# Patient Record
Sex: FEMALE | Race: WHITE | NOT HISPANIC OR LATINO | Employment: FULL TIME | URBAN - METROPOLITAN AREA
[De-identification: names, ages, dates, MRNs, and addresses within clinical notes are randomized per-mention and may not be internally consistent; named-entity substitution may affect disease eponyms.]

---

## 2018-07-11 ENCOUNTER — APPOINTMENT (OUTPATIENT)
Dept: LAB | Facility: CLINIC | Age: 23
End: 2018-07-11
Payer: COMMERCIAL

## 2018-07-11 ENCOUNTER — TRANSCRIBE ORDERS (OUTPATIENT)
Dept: LAB | Facility: CLINIC | Age: 23
End: 2018-07-11

## 2018-07-11 DIAGNOSIS — Z13.220 SCREENING FOR LIPOID DISORDERS: Primary | ICD-10-CM

## 2018-07-11 DIAGNOSIS — Z13.220 SCREENING FOR LIPOID DISORDERS: ICD-10-CM

## 2018-07-11 LAB
ALBUMIN SERPL BCP-MCNC: 3.4 G/DL (ref 3.5–5)
ALP SERPL-CCNC: 107 U/L (ref 46–116)
ALT SERPL W P-5'-P-CCNC: 54 U/L (ref 12–78)
ANION GAP SERPL CALCULATED.3IONS-SCNC: 7 MMOL/L (ref 4–13)
AST SERPL W P-5'-P-CCNC: 39 U/L (ref 5–45)
BASOPHILS # BLD AUTO: 0.04 THOUSANDS/ΜL (ref 0–0.1)
BASOPHILS NFR BLD AUTO: 1 % (ref 0–1)
BILIRUB SERPL-MCNC: 0.55 MG/DL (ref 0.2–1)
BUN SERPL-MCNC: 9 MG/DL (ref 5–25)
CALCIUM SERPL-MCNC: 9 MG/DL (ref 8.3–10.1)
CHLORIDE SERPL-SCNC: 105 MMOL/L (ref 100–108)
CHOLEST SERPL-MCNC: 105 MG/DL (ref 50–200)
CO2 SERPL-SCNC: 23 MMOL/L (ref 21–32)
CREAT SERPL-MCNC: 0.6 MG/DL (ref 0.6–1.3)
EOSINOPHIL # BLD AUTO: 0.12 THOUSAND/ΜL (ref 0–0.61)
EOSINOPHIL NFR BLD AUTO: 2 % (ref 0–6)
ERYTHROCYTE [DISTWIDTH] IN BLOOD BY AUTOMATED COUNT: 15.7 % (ref 11.6–15.1)
GFR SERPL CREATININE-BSD FRML MDRD: 128 ML/MIN/1.73SQ M
GLUCOSE P FAST SERPL-MCNC: 77 MG/DL (ref 65–99)
HCT VFR BLD AUTO: 39.6 % (ref 34.8–46.1)
HDLC SERPL-MCNC: 42 MG/DL (ref 40–60)
HGB BLD-MCNC: 11.8 G/DL (ref 11.5–15.4)
IMM GRANULOCYTES # BLD AUTO: 0.01 THOUSAND/UL (ref 0–0.2)
IMM GRANULOCYTES NFR BLD AUTO: 0 % (ref 0–2)
LDLC SERPL CALC-MCNC: 41 MG/DL (ref 0–100)
LYMPHOCYTES # BLD AUTO: 1.48 THOUSANDS/ΜL (ref 0.6–4.47)
LYMPHOCYTES NFR BLD AUTO: 30 % (ref 14–44)
MCH RBC QN AUTO: 24.2 PG (ref 26.8–34.3)
MCHC RBC AUTO-ENTMCNC: 29.8 G/DL (ref 31.4–37.4)
MCV RBC AUTO: 81 FL (ref 82–98)
MONOCYTES # BLD AUTO: 0.4 THOUSAND/ΜL (ref 0.17–1.22)
MONOCYTES NFR BLD AUTO: 8 % (ref 4–12)
NEUTROPHILS # BLD AUTO: 2.89 THOUSANDS/ΜL (ref 1.85–7.62)
NEUTS SEG NFR BLD AUTO: 59 % (ref 43–75)
NONHDLC SERPL-MCNC: 63 MG/DL
NRBC BLD AUTO-RTO: 0 /100 WBCS
PLATELET # BLD AUTO: 364 THOUSANDS/UL (ref 149–390)
PMV BLD AUTO: 10.6 FL (ref 8.9–12.7)
POTASSIUM SERPL-SCNC: 4.1 MMOL/L (ref 3.5–5.3)
PROT SERPL-MCNC: 8.7 G/DL (ref 6.4–8.2)
RBC # BLD AUTO: 4.88 MILLION/UL (ref 3.81–5.12)
SODIUM SERPL-SCNC: 135 MMOL/L (ref 136–145)
TRIGL SERPL-MCNC: 109 MG/DL
TSH SERPL DL<=0.05 MIU/L-ACNC: 1.93 UIU/ML (ref 0.36–3.74)
WBC # BLD AUTO: 4.94 THOUSAND/UL (ref 4.31–10.16)

## 2018-07-11 PROCEDURE — 80061 LIPID PANEL: CPT

## 2018-07-11 PROCEDURE — 84443 ASSAY THYROID STIM HORMONE: CPT

## 2018-07-11 PROCEDURE — 85025 COMPLETE CBC W/AUTO DIFF WBC: CPT

## 2018-07-11 PROCEDURE — 36415 COLL VENOUS BLD VENIPUNCTURE: CPT

## 2018-07-11 PROCEDURE — 80053 COMPREHEN METABOLIC PANEL: CPT

## 2018-12-14 ENCOUNTER — OFFICE VISIT (OUTPATIENT)
Dept: URGENT CARE | Facility: CLINIC | Age: 23
End: 2018-12-14
Payer: COMMERCIAL

## 2018-12-14 VITALS
BODY MASS INDEX: 42.33 KG/M2 | SYSTOLIC BLOOD PRESSURE: 132 MMHG | OXYGEN SATURATION: 99 % | DIASTOLIC BLOOD PRESSURE: 84 MMHG | HEIGHT: 62 IN | WEIGHT: 230 LBS | HEART RATE: 100 BPM | RESPIRATION RATE: 16 BRPM | TEMPERATURE: 99.5 F

## 2018-12-14 DIAGNOSIS — J02.9 SORE THROAT: Primary | ICD-10-CM

## 2018-12-14 LAB — S PYO AG THROAT QL: NEGATIVE

## 2018-12-14 PROCEDURE — 99213 OFFICE O/P EST LOW 20 MIN: CPT | Performed by: PHYSICIAN ASSISTANT

## 2018-12-14 PROCEDURE — 87430 STREP A AG IA: CPT | Performed by: PHYSICIAN ASSISTANT

## 2018-12-14 PROCEDURE — S9088 SERVICES PROVIDED IN URGENT: HCPCS | Performed by: PHYSICIAN ASSISTANT

## 2018-12-14 PROCEDURE — 87070 CULTURE OTHR SPECIMN AEROBIC: CPT | Performed by: PHYSICIAN ASSISTANT

## 2018-12-14 RX ORDER — METHYLPHENIDATE HYDROCHLORIDE 54 MG/1
TABLET ORAL
COMMUNITY

## 2018-12-14 RX ORDER — LEVONORGESTREL / ETHINYL ESTRADIOL AND ETHINYL ESTRADIOL 150-30(84)
1 KIT ORAL
COMMUNITY
Start: 2018-09-21

## 2018-12-14 RX ORDER — PHENOL 1.4 %
AEROSOL, SPRAY (ML) MUCOUS MEMBRANE
COMMUNITY

## 2018-12-14 NOTE — PROGRESS NOTES
[facility]        NAME: David Nava is a 24 y o  female  : 1995    MRN: [id number]  DATE: 2018  TIME: 10:21 AM    Assessment and Plan   Sore throat [J02 9]  1  Sore throat  POCT rapid strepA         Patient Instructions       Negative rapid strep here  Cobblestoning and benign exam   Viral sore throat  Needs Tylenol and Motrin for sore throat, saltwater gargles  Should resolve in 3-5 days  We will check a culture and call if it is positive  Follow up with PCP in 3-5 days  Proceed to  ER if symptoms worsen  Chief Complaint     Chief Complaint   Patient presents with    Sore Throat     hurts to swollow x 3-4 days, phlegm in throat, took mucinex    Earache     right ear x 1 day         History of Present Illness         43-year-old female complains of sore throat for 2 days  She has right ear pain  She took Mucinex for congestion and cough  No fever at home  No nausea or vomiting  She says it hurts to swallow          Review of Systems   Review of Systems      Current Medications       Current Outpatient Prescriptions:     Levonorgest-Eth Estrad 91-Day (CAMRESE) 0 15-0 03 &0 01 MG TABS, Take 1 tablet by mouth, Disp: , Rfl:     Melatonin 10 MG TABS, Take by mouth, Disp: , Rfl:     methylphenidate (CONCERTA) 54 MG ER tablet, take 1 tablet by mouth once daily, Disp: , Rfl:     Current Allergies     Allergies as of 2018 - Reviewed 2018   Allergen Reaction Noted    Bupropion Other (See Comments) 2015    Lamotrigine  2014            The following portions of the patient's history were reviewed and updated as appropriate: allergies, current medications, past family history, past medical history, past social history, past surgical history and problem list      Past Medical History:   Diagnosis Date    ADHD (attention deficit hyperactivity disorder)        Past Surgical History:   Procedure Laterality Date    KNEE SURGERY      WISDOM TOOTH EXTRACTION      WRIST SURGERY         Family History   Problem Relation Age of Onset    Adopted: Yes         Medications have been verified  Objective   /84 (BP Location: Right arm, Patient Position: Sitting, Cuff Size: Standard)   Pulse 100   Temp 99 5 °F (37 5 °C) (Tympanic)   Resp 16   Ht 5' 1 75" (1 568 m)   Wt 104 kg (230 lb)   SpO2 99%   BMI 42 41 kg/m²        Physical Exam     Physical Exam   Constitutional: She appears well-developed and well-nourished  No distress  HENT:   Right Ear: Tympanic membrane, external ear and ear canal normal    Left Ear: Tympanic membrane, external ear and ear canal normal    Nose: Nose normal  Right sinus exhibits no maxillary sinus tenderness and no frontal sinus tenderness  Left sinus exhibits no maxillary sinus tenderness and no frontal sinus tenderness  Mouth/Throat: Posterior oropharyngeal erythema present  No oropharyngeal exudate or posterior oropharyngeal edema  Posterior pharynx cobblestoning and postnasal drip   Eyes: Pupils are equal, round, and reactive to light  Conjunctivae and EOM are normal  No scleral icterus  Neck: Normal range of motion  Neck supple  Cardiovascular: Normal rate, regular rhythm and normal heart sounds  Pulmonary/Chest: Effort normal and breath sounds normal  No respiratory distress  She has no wheezes  She has no rales  Abdominal: Soft  Bowel sounds are normal  She exhibits no distension and no mass  There is no tenderness  There is no rebound and no guarding  Lymphadenopathy:     She has no cervical adenopathy  Skin: Skin is warm and dry  No rash noted

## 2018-12-16 LAB — BACTERIA THROAT CULT: NORMAL

## 2019-06-04 ENCOUNTER — OFFICE VISIT (OUTPATIENT)
Dept: URGENT CARE | Facility: CLINIC | Age: 24
End: 2019-06-04
Payer: COMMERCIAL

## 2019-06-04 VITALS
HEIGHT: 62 IN | DIASTOLIC BLOOD PRESSURE: 90 MMHG | WEIGHT: 239 LBS | OXYGEN SATURATION: 98 % | BODY MASS INDEX: 43.98 KG/M2 | HEART RATE: 106 BPM | RESPIRATION RATE: 18 BRPM | SYSTOLIC BLOOD PRESSURE: 138 MMHG | TEMPERATURE: 98.3 F

## 2019-06-04 DIAGNOSIS — J01.10 ACUTE FRONTAL SINUSITIS, RECURRENCE NOT SPECIFIED: Primary | ICD-10-CM

## 2019-06-04 PROCEDURE — 99284 EMERGENCY DEPT VISIT MOD MDM: CPT | Performed by: PHYSICIAN ASSISTANT

## 2019-06-04 PROCEDURE — G0383 LEV 4 HOSP TYPE B ED VISIT: HCPCS | Performed by: PHYSICIAN ASSISTANT

## 2019-06-04 PROCEDURE — 99214 OFFICE O/P EST MOD 30 MIN: CPT | Performed by: PHYSICIAN ASSISTANT

## 2019-06-04 RX ORDER — FLUTICASONE PROPIONATE 50 MCG
1 SPRAY, SUSPENSION (ML) NASAL DAILY
Qty: 1 BOTTLE | Refills: 0 | Status: SHIPPED | OUTPATIENT
Start: 2019-06-04 | End: 2020-02-17

## 2019-06-04 RX ORDER — AMOXICILLIN AND CLAVULANATE POTASSIUM 875; 125 MG/1; MG/1
1 TABLET, FILM COATED ORAL EVERY 12 HOURS SCHEDULED
Qty: 20 TABLET | Refills: 0 | Status: SHIPPED | OUTPATIENT
Start: 2019-06-04 | End: 2019-06-14

## 2020-02-17 ENCOUNTER — OFFICE VISIT (OUTPATIENT)
Dept: URGENT CARE | Facility: CLINIC | Age: 25
End: 2020-02-17
Payer: COMMERCIAL

## 2020-02-17 VITALS
WEIGHT: 207 LBS | TEMPERATURE: 99.3 F | HEIGHT: 62 IN | OXYGEN SATURATION: 97 % | RESPIRATION RATE: 18 BRPM | SYSTOLIC BLOOD PRESSURE: 134 MMHG | HEART RATE: 111 BPM | BODY MASS INDEX: 38.09 KG/M2 | DIASTOLIC BLOOD PRESSURE: 106 MMHG

## 2020-02-17 DIAGNOSIS — J06.9 UPPER RESPIRATORY TRACT INFECTION, UNSPECIFIED TYPE: Primary | ICD-10-CM

## 2020-02-17 LAB — S PYO AG THROAT QL: NEGATIVE

## 2020-02-17 PROCEDURE — 87880 STREP A ASSAY W/OPTIC: CPT | Performed by: PHYSICIAN ASSISTANT

## 2020-02-17 PROCEDURE — 99213 OFFICE O/P EST LOW 20 MIN: CPT | Performed by: PHYSICIAN ASSISTANT

## 2020-02-17 RX ORDER — BROMPHENIRAMINE MALEATE, PSEUDOEPHEDRINE HYDROCHLORIDE, AND DEXTROMETHORPHAN HYDROBROMIDE 2; 30; 10 MG/5ML; MG/5ML; MG/5ML
5 SYRUP ORAL 4 TIMES DAILY PRN
Qty: 118 ML | Refills: 0 | Status: SHIPPED | OUTPATIENT
Start: 2020-02-17

## 2020-02-17 RX ORDER — FLUTICASONE PROPIONATE 50 MCG
1 SPRAY, SUSPENSION (ML) NASAL DAILY
Qty: 16 G | Refills: 0 | Status: SHIPPED | OUTPATIENT
Start: 2020-02-17

## 2020-02-17 NOTE — PROGRESS NOTES
[facility]        NAME: Jina Limon is a 21 y o  female  : 1995    MRN: [id number]  DATE: 2020  TIME: 11:29 AM    Assessment and Plan   Upper respiratory tract infection, unspecified type [J06 9]  1  Upper respiratory tract infection, unspecified type  POCT rapid strepA    fluticasone (FLONASE) 50 mcg/act nasal spray    brompheniramine-pseudoephedrine-DM 30-2-10 MG/5ML syrup         Patient Instructions   Patient Instructions     Can use Flonase and Bromfed  Sinuses nontender  Follow up with PCP in 3-5 days  Proceed to  ER if symptoms worsen  Chief Complaint     Chief Complaint   Patient presents with    Sinusitis     c/o sinus pain/congestion x 3 days and ears clogged x 2 days  No fevers  Also c/o swollen neck glands         History of Present Illness       Complaining of cough sinus congestion sore throat for the last 2 days  No fever chills  Taking Mucinex DM  No nausea vomiting  No fever home  No ear pain or pressure  Review of Systems   Review of Systems   Constitutional: Negative for fatigue and fever  HENT: Positive for congestion, postnasal drip, rhinorrhea, sinus pressure and sore throat  Respiratory: Positive for cough            Current Medications       Current Outpatient Medications:     Levonorgest-Eth Estrad -Day (CAMRESE) 0 15-0 03 &0 01 MG TABS, Take 1 tablet by mouth, Disp: , Rfl:     Melatonin 10 MG TABS, Take by mouth, Disp: , Rfl:     methylphenidate (CONCERTA) 54 MG ER tablet, take 1 tablet by mouth once daily, Disp: , Rfl:     VITAMIN D PO, Take by mouth, Disp: , Rfl:     brompheniramine-pseudoephedrine-DM 30-2-10 MG/5ML syrup, Take 5 mL by mouth 4 (four) times a day as needed for allergies, Disp: 118 mL, Rfl: 0    fluticasone (FLONASE) 50 mcg/act nasal spray, 1 spray into each nostril daily, Disp: 16 g, Rfl: 0    Current Allergies     Allergies as of 2020 - Reviewed 2020   Allergen Reaction Noted    Bupropion Other (See Comments) 02/13/2015    Lamotrigine  12/31/2014            The following portions of the patient's history were reviewed and updated as appropriate: allergies, current medications, past family history, past medical history, past social history, past surgical history and problem list      Past Medical History:   Diagnosis Date    ADHD (attention deficit hyperactivity disorder)     Anxiety     Depression        Past Surgical History:   Procedure Laterality Date    KNEE SURGERY      WISDOM TOOTH EXTRACTION      WRIST SURGERY         Family History   Adopted: Yes         Medications have been verified  Objective   BP (!) 134/106   Pulse (!) 111   Temp 99 3 °F (37 4 °C) (Oral)   Resp 18   Ht 5' 1 75" (1 568 m)   Wt 93 9 kg (207 lb)   LMP  (Within Months) Comment: states she gets periods every 3 months d/t the kind of birth control she's on  SpO2 97%   BMI 38 17 kg/m²        Physical Exam     Physical Exam   Constitutional: She is oriented to person, place, and time  She appears well-developed and well-nourished  No distress  HENT:   Head: Normocephalic and atraumatic  Right Ear: Tympanic membrane, external ear and ear canal normal  Tympanic membrane is not erythematous, not retracted and not bulging  No middle ear effusion  Left Ear: Tympanic membrane, external ear and ear canal normal  Tympanic membrane is not erythematous, not retracted and not bulging  No middle ear effusion  Nose: Mucosal edema and rhinorrhea present  Right sinus exhibits no maxillary sinus tenderness and no frontal sinus tenderness  Left sinus exhibits no maxillary sinus tenderness and no frontal sinus tenderness  Mouth/Throat: Mucous membranes are normal  Posterior oropharyngeal erythema present  Eyes: Pupils are equal, round, and reactive to light  Conjunctivae and EOM are normal  Right eye exhibits no chemosis and no discharge  Left eye exhibits no chemosis and no discharge   Right conjunctiva is not injected  Left conjunctiva is not injected  Neck: Normal range of motion  Neck supple  Cardiovascular: Normal rate, regular rhythm and normal heart sounds  Pulmonary/Chest: Effort normal and breath sounds normal  No respiratory distress  She has no wheezes  She has no rales  Lymphadenopathy:     She has no cervical adenopathy  Right cervical: No superficial cervical adenopathy present  Left cervical: No superficial cervical adenopathy present  Neurological: She is alert and oriented to person, place, and time  No cranial nerve deficit  Skin: Skin is warm and dry  No rash noted

## 2020-07-22 ENCOUNTER — TRANSCRIBE ORDERS (OUTPATIENT)
Dept: LAB | Facility: CLINIC | Age: 25
End: 2020-07-22

## 2020-07-22 ENCOUNTER — APPOINTMENT (OUTPATIENT)
Dept: LAB | Facility: CLINIC | Age: 25
End: 2020-07-22
Payer: COMMERCIAL

## 2020-07-22 DIAGNOSIS — N93.9 VAGINAL HEMORRHAGE: ICD-10-CM

## 2020-07-22 DIAGNOSIS — N92.1 METRORRHAGIA: Primary | ICD-10-CM

## 2020-07-22 DIAGNOSIS — N92.1 METRORRHAGIA: ICD-10-CM

## 2020-07-22 LAB
25(OH)D3 SERPL-MCNC: 32.7 NG/ML (ref 30–100)
ESTRADIOL SERPL-MCNC: 15 PG/ML
FSH SERPL-ACNC: 0.2 MIU/ML
GLUCOSE P FAST SERPL-MCNC: 74 MG/DL (ref 65–99)
LH SERPL-ACNC: <0.2 MIU/ML
PROGEST SERPL-MCNC: 0.4 NG/ML
PROLACTIN SERPL-MCNC: 17 NG/ML
TESTOST SERPL-MCNC: 14 NG/DL
TSH SERPL DL<=0.05 MIU/L-ACNC: 1.22 UIU/ML (ref 0.36–3.74)

## 2020-07-22 PROCEDURE — 84144 ASSAY OF PROGESTERONE: CPT

## 2020-07-22 PROCEDURE — 83001 ASSAY OF GONADOTROPIN (FSH): CPT

## 2020-07-22 PROCEDURE — 82670 ASSAY OF TOTAL ESTRADIOL: CPT

## 2020-07-22 PROCEDURE — 83002 ASSAY OF GONADOTROPIN (LH): CPT

## 2020-07-22 PROCEDURE — 36415 COLL VENOUS BLD VENIPUNCTURE: CPT

## 2020-07-22 PROCEDURE — 84403 ASSAY OF TOTAL TESTOSTERONE: CPT

## 2020-07-22 PROCEDURE — 84443 ASSAY THYROID STIM HORMONE: CPT

## 2020-07-22 PROCEDURE — 82947 ASSAY GLUCOSE BLOOD QUANT: CPT

## 2020-07-22 PROCEDURE — 82306 VITAMIN D 25 HYDROXY: CPT

## 2020-07-22 PROCEDURE — 83516 IMMUNOASSAY NONANTIBODY: CPT

## 2020-07-22 PROCEDURE — 84146 ASSAY OF PROLACTIN: CPT

## 2020-07-22 PROCEDURE — 83525 ASSAY OF INSULIN: CPT

## 2020-07-26 LAB — MIS SERPL-MCNC: 1.86 NG/ML

## 2020-07-29 LAB — MISCELLANEOUS LAB TEST RESULT: NORMAL

## 2021-01-13 ENCOUNTER — TELEPHONE (OUTPATIENT)
Dept: OBGYN CLINIC | Facility: MEDICAL CENTER | Age: 26
End: 2021-01-13

## 2021-01-13 DIAGNOSIS — N91.2 AMENORRHEA: Primary | ICD-10-CM

## 2021-01-13 NOTE — TELEPHONE ENCOUNTER
Pt called in with positive pregnancy test  LMP was December 12th and she would like to report she only had her menstrual cycle for 1-2 days  Please call pt and schedule appropriate bloodwork

## 2021-01-24 ENCOUNTER — APPOINTMENT (EMERGENCY)
Dept: ULTRASOUND IMAGING | Facility: HOSPITAL | Age: 26
End: 2021-01-24
Payer: COMMERCIAL

## 2021-01-24 ENCOUNTER — HOSPITAL ENCOUNTER (EMERGENCY)
Facility: HOSPITAL | Age: 26
Discharge: HOME | End: 2021-01-24
Attending: EMERGENCY MEDICINE | Admitting: EMERGENCY MEDICINE
Payer: COMMERCIAL

## 2021-01-24 VITALS
SYSTOLIC BLOOD PRESSURE: 112 MMHG | RESPIRATION RATE: 16 BRPM | TEMPERATURE: 98.2 F | OXYGEN SATURATION: 100 % | DIASTOLIC BLOOD PRESSURE: 72 MMHG | HEART RATE: 73 BPM

## 2021-01-24 DIAGNOSIS — N39.0 URINARY TRACT INFECTION: ICD-10-CM

## 2021-01-24 DIAGNOSIS — R10.9 ABDOMINAL PAIN IN PREGNANCY, FIRST TRIMESTER: ICD-10-CM

## 2021-01-24 DIAGNOSIS — O26.891 ABDOMINAL PAIN IN PREGNANCY, FIRST TRIMESTER: ICD-10-CM

## 2021-01-24 DIAGNOSIS — O20.9 VAGINAL BLEEDING IN PREGNANCY, FIRST TRIMESTER: Primary | ICD-10-CM

## 2021-01-24 LAB
ABO GROUP BLD: NORMAL
ALBUMIN SERPL BCP-MCNC: 3.4 G/DL (ref 3.5–5)
ALP SERPL-CCNC: 76 U/L (ref 46–116)
ALT SERPL W P-5'-P-CCNC: 22 U/L (ref 12–78)
ANION GAP SERPL CALCULATED.3IONS-SCNC: 7 MMOL/L (ref 4–13)
AST SERPL W P-5'-P-CCNC: 17 U/L (ref 5–45)
B-HCG SERPL-ACNC: ABNORMAL MIU/ML
BACTERIA UR QL AUTO: ABNORMAL /HPF
BASOPHILS # BLD AUTO: 0.02 THOUSANDS/ΜL (ref 0–0.1)
BASOPHILS NFR BLD AUTO: 0 % (ref 0–1)
BILIRUB SERPL-MCNC: 0.3 MG/DL (ref 0.2–1)
BILIRUB UR QL STRIP: NEGATIVE
BUN SERPL-MCNC: 14 MG/DL (ref 5–25)
CALCIUM ALBUM COR SERPL-MCNC: 9.5 MG/DL (ref 8.3–10.1)
CALCIUM SERPL-MCNC: 9 MG/DL (ref 8.3–10.1)
CHLORIDE SERPL-SCNC: 104 MMOL/L (ref 100–108)
CLARITY UR: ABNORMAL
CO2 SERPL-SCNC: 28 MMOL/L (ref 21–32)
COLOR UR: ABNORMAL
CREAT SERPL-MCNC: 0.54 MG/DL (ref 0.6–1.3)
EOSINOPHIL # BLD AUTO: 0.08 THOUSAND/ΜL (ref 0–0.61)
EOSINOPHIL NFR BLD AUTO: 2 % (ref 0–6)
ERYTHROCYTE [DISTWIDTH] IN BLOOD BY AUTOMATED COUNT: 12.5 % (ref 11.6–15.1)
GFR SERPL CREATININE-BSD FRML MDRD: 131 ML/MIN/1.73SQ M
GLUCOSE SERPL-MCNC: 82 MG/DL (ref 65–140)
GLUCOSE UR STRIP-MCNC: NEGATIVE MG/DL
HCT VFR BLD AUTO: 38.7 % (ref 34.8–46.1)
HGB BLD-MCNC: 12.3 G/DL (ref 11.5–15.4)
HGB UR QL STRIP.AUTO: ABNORMAL
IMM GRANULOCYTES # BLD AUTO: 0.01 THOUSAND/UL (ref 0–0.2)
IMM GRANULOCYTES NFR BLD AUTO: 0 % (ref 0–2)
KETONES UR STRIP-MCNC: NEGATIVE MG/DL
LEUKOCYTE ESTERASE UR QL STRIP: ABNORMAL
LIPASE SERPL-CCNC: 182 U/L (ref 73–393)
LYMPHOCYTES # BLD AUTO: 1.37 THOUSANDS/ΜL (ref 0.6–4.47)
LYMPHOCYTES NFR BLD AUTO: 26 % (ref 14–44)
MCH RBC QN AUTO: 28.4 PG (ref 26.8–34.3)
MCHC RBC AUTO-ENTMCNC: 31.8 G/DL (ref 31.4–37.4)
MCV RBC AUTO: 89 FL (ref 82–98)
MONOCYTES # BLD AUTO: 0.45 THOUSAND/ΜL (ref 0.17–1.22)
MONOCYTES NFR BLD AUTO: 9 % (ref 4–12)
MUCOUS THREADS UR QL AUTO: ABNORMAL
NEUTROPHILS # BLD AUTO: 3.27 THOUSANDS/ΜL (ref 1.85–7.62)
NEUTS SEG NFR BLD AUTO: 63 % (ref 43–75)
NITRITE UR QL STRIP: NEGATIVE
NON-SQ EPI CELLS URNS QL MICRO: ABNORMAL /HPF
NRBC BLD AUTO-RTO: 0 /100 WBCS
PH UR STRIP.AUTO: 7 [PH]
PLATELET # BLD AUTO: 260 THOUSANDS/UL (ref 149–390)
PMV BLD AUTO: 10.1 FL (ref 8.9–12.7)
POTASSIUM SERPL-SCNC: 3.9 MMOL/L (ref 3.5–5.3)
PROT SERPL-MCNC: 7.5 G/DL (ref 6.4–8.2)
PROT UR STRIP-MCNC: NEGATIVE MG/DL
RBC # BLD AUTO: 4.33 MILLION/UL (ref 3.81–5.12)
RBC #/AREA URNS AUTO: ABNORMAL /HPF
RH BLD: POSITIVE
SODIUM SERPL-SCNC: 139 MMOL/L (ref 136–145)
SP GR UR STRIP.AUTO: 1.02 (ref 1–1.03)
UROBILINOGEN UR QL STRIP.AUTO: 0.2 E.U./DL
WBC # BLD AUTO: 5.2 THOUSAND/UL (ref 4.31–10.16)
WBC #/AREA URNS AUTO: ABNORMAL /HPF

## 2021-01-24 PROCEDURE — 99284 EMERGENCY DEPT VISIT MOD MDM: CPT

## 2021-01-24 PROCEDURE — 86900 BLOOD TYPING SEROLOGIC ABO: CPT | Performed by: PHYSICIAN ASSISTANT

## 2021-01-24 PROCEDURE — 36415 COLL VENOUS BLD VENIPUNCTURE: CPT | Performed by: EMERGENCY MEDICINE

## 2021-01-24 PROCEDURE — 81001 URINALYSIS AUTO W/SCOPE: CPT | Performed by: PHYSICIAN ASSISTANT

## 2021-01-24 PROCEDURE — 84702 CHORIONIC GONADOTROPIN TEST: CPT | Performed by: EMERGENCY MEDICINE

## 2021-01-24 PROCEDURE — 76801 OB US < 14 WKS SINGLE FETUS: CPT

## 2021-01-24 PROCEDURE — 99284 EMERGENCY DEPT VISIT MOD MDM: CPT | Performed by: PHYSICIAN ASSISTANT

## 2021-01-24 PROCEDURE — 86901 BLOOD TYPING SEROLOGIC RH(D): CPT | Performed by: PHYSICIAN ASSISTANT

## 2021-01-24 PROCEDURE — 85025 COMPLETE CBC W/AUTO DIFF WBC: CPT | Performed by: EMERGENCY MEDICINE

## 2021-01-24 PROCEDURE — 80053 COMPREHEN METABOLIC PANEL: CPT | Performed by: EMERGENCY MEDICINE

## 2021-01-24 PROCEDURE — 87086 URINE CULTURE/COLONY COUNT: CPT | Performed by: PHYSICIAN ASSISTANT

## 2021-01-24 PROCEDURE — 83690 ASSAY OF LIPASE: CPT | Performed by: EMERGENCY MEDICINE

## 2021-01-24 RX ORDER — CEPHALEXIN 500 MG/1
500 CAPSULE ORAL EVERY 12 HOURS SCHEDULED
Qty: 13 CAPSULE | Refills: 0 | Status: SHIPPED | OUTPATIENT
Start: 2021-01-24 | End: 2021-01-31

## 2021-01-24 RX ORDER — CEPHALEXIN 250 MG/1
500 CAPSULE ORAL ONCE
Status: COMPLETED | OUTPATIENT
Start: 2021-01-24 | End: 2021-01-24

## 2021-01-24 RX ADMIN — CEPHALEXIN 500 MG: 250 CAPSULE ORAL at 20:20

## 2021-01-24 NOTE — ED PROVIDER NOTES
History  Chief Complaint   Patient presents with    Abdominal Pain Pregnant     pt states approx 6 5 weeks pregnant  pt reports having abdomianl cramping for the last few days  pt states to have started with bleeding/spotting as of today  32yo  female currently at 6 5 weeks gestation by LMP presenting for evaluation of abdominal cramping and vaginal bleeding  Patient started with LLQ cramping several days ago  This morning, patient noticed vaginal bleeding  She denies passing any clots and vaginal bleeding has now improved  Patient admits her pain is mild but wants to make sure everything is okay with her pregnancy  Patient had a transvaginal ultrasound on 21 which revealed an intrauterine sac but no yolk sac or fetal pole  She was estimated to be <5 weeks pregnant at that time  Patient is otherwise asymptomatic  No fevers, chills, vaginal discharge, dysuria, vomiting  History provided by:  Patient and medical records   used: No    Vaginal Bleeding  Quality:  Spotting and lighter than menses  Severity:  Mild  Timing:  Constant  Progression:  Improving  Chronicity:  New  Possible pregnancy: yes    Relieved by:  Nothing  Worsened by:  Nothing  Ineffective treatments:  None tried  Associated symptoms: abdominal pain and nausea    Associated symptoms: no back pain, no dizziness, no dysuria, no fatigue, no fever and no vaginal discharge        Prior to Admission Medications   Prescriptions Last Dose Informant Patient Reported? Taking?    Levonorgest-Eth Estrad -Day (CAMRESE) 0 15-0 03 &0 01 MG TABS   Yes No   Sig: Take 1 tablet by mouth   Melatonin 10 MG TABS   Yes No   Sig: Take by mouth   VITAMIN D PO   Yes No   Sig: Take by mouth   brompheniramine-pseudoephedrine-DM 30-2-10 MG/5ML syrup   No No   Sig: Take 5 mL by mouth 4 (four) times a day as needed for allergies   fluticasone (FLONASE) 50 mcg/act nasal spray   No No   Si spray into each nostril daily   methylphenidate (CONCERTA) 54 MG ER tablet   Yes No   Sig: take 1 tablet by mouth once daily      Facility-Administered Medications: None       Past Medical History:   Diagnosis Date    ADHD (attention deficit hyperactivity disorder)     Anxiety     Depression     PTSD (post-traumatic stress disorder)        Past Surgical History:   Procedure Laterality Date    KNEE SURGERY      WISDOM TOOTH EXTRACTION      WRIST SURGERY         Family History   Adopted: Yes     I have reviewed and agree with the history as documented  E-Cigarette/Vaping    E-Cigarette Use Never User      E-Cigarette/Vaping Substances     Social History     Tobacco Use    Smoking status: Former Smoker    Smokeless tobacco: Never Used   Substance Use Topics    Alcohol use: Not Currently    Drug use: Not Currently       Review of Systems   Constitutional: Negative for chills, fatigue and fever  Eyes: Negative for discharge and redness  Respiratory: Negative for shortness of breath and stridor  Gastrointestinal: Positive for abdominal pain and nausea  Negative for diarrhea and vomiting  Genitourinary: Positive for pelvic pain and vaginal bleeding  Negative for difficulty urinating, dysuria and vaginal discharge  Musculoskeletal: Negative for back pain and neck pain  Skin: Negative for color change and rash  Neurological: Negative for dizziness  Psychiatric/Behavioral: Negative for confusion  The patient is not nervous/anxious  All other systems reviewed and are negative  Physical Exam  Physical Exam  Vitals signs and nursing note reviewed  Constitutional:       General: She is not in acute distress  Appearance: She is well-developed  She is not diaphoretic  HENT:      Head: Normocephalic and atraumatic  Right Ear: External ear normal       Left Ear: External ear normal       Nose: Nose normal    Eyes:      General: No scleral icterus  Right eye: No discharge  Left eye: No discharge  Conjunctiva/sclera: Conjunctivae normal    Neck:      Musculoskeletal: Normal range of motion and neck supple  Cardiovascular:      Rate and Rhythm: Normal rate and regular rhythm  Heart sounds: Normal heart sounds  No murmur  Pulmonary:      Effort: Pulmonary effort is normal  No respiratory distress  Breath sounds: Normal breath sounds  No stridor  No wheezing or rales  Abdominal:      General: Bowel sounds are normal  There is no distension  Palpations: Abdomen is soft  Tenderness: There is abdominal tenderness in the left lower quadrant  There is no guarding  Musculoskeletal: Normal range of motion  General: No deformity  Lymphadenopathy:      Cervical: No cervical adenopathy  Skin:     General: Skin is warm and dry  Neurological:      Mental Status: She is alert  She is not disoriented  GCS: GCS eye subscore is 4  GCS verbal subscore is 5  GCS motor subscore is 6     Psychiatric:         Behavior: Behavior normal          Vital Signs  ED Triage Vitals [01/24/21 1602]   Temperature Pulse Respirations Blood Pressure SpO2   98 2 °F (36 8 °C) 86 16 133/83 100 %      Temp Source Heart Rate Source Patient Position - Orthostatic VS BP Location FiO2 (%)   Tympanic Monitor Sitting Right arm --      Pain Score       --           Vitals:    01/24/21 1602 01/24/21 1930   BP: 133/83 112/72   Pulse: 86 73   Patient Position - Orthostatic VS: Sitting Sitting         Visual Acuity      ED Medications  Medications   cephalexin (KEFLEX) capsule 500 mg (500 mg Oral Given 1/24/21 2020)       Diagnostic Studies  Results Reviewed     Procedure Component Value Units Date/Time    Lipase [[id number]]  (Normal) Collected: 01/24/21 1751    Lab Status: Final result Specimen: Blood from Arm, Right Updated: 01/24/21 1848     Lipase 182 u/L     hCG, quantitative, pregnancy [[id number]]  (Abnormal) Collected: 01/24/21 1751    Lab Status: Final result Specimen: Blood from Arm, Right Updated: 01/24/21 1848     HCG, Quant 48,014 mIU/mL     Narrative:       Expected Ranges:     Approximate               Approximate HCG  Gestation age          Concentration ( mIU/mL)  _____________          ______________________   [facility]                      HCG values  0 2-1                       5-50  1-2                           2-3                         100-5000  3-4                         500-50221  4-5                         1000-89486  5-6                         [id number]  6-8                         77120-140530  8-12                        [id number]      Urine Microscopic [[id number]]  (Abnormal) Collected: 01/24/21 1752    Lab Status: Final result Specimen: Urine, Clean Catch Updated: 01/24/21 1834     RBC, UA 0-1 /hpf      WBC, UA 1-2 /hpf      Epithelial Cells Occasional /hpf      Bacteria, UA Moderate /hpf      MUCUS THREADS Occasional     URINE COMMENT --    Comprehensive metabolic panel [[id number]]  (Abnormal) Collected: 01/24/21 1751    Lab Status: Final result Specimen: Blood from Arm, Right Updated: 01/24/21 1812     Sodium 139 mmol/L      Potassium 3 9 mmol/L      Chloride 104 mmol/L      CO2 28 mmol/L      ANION GAP 7 mmol/L      BUN 14 mg/dL      Creatinine 0 54 mg/dL      Glucose 82 mg/dL      Calcium 9 0 mg/dL      Corrected Calcium 9 5 mg/dL      AST 17 U/L      ALT 22 U/L      Alkaline Phosphatase 76 U/L      Total Protein 7 5 g/dL      Albumin 3 4 g/dL      Total Bilirubin 0 30 mg/dL      eGFR 131 ml/min/1 73sq m     Narrative:      Janet guidelines for Chronic Kidney Disease (CKD):     Stage 1 with normal or high GFR (GFR > 90 mL/min/1 73 square meters)    Stage 2 Mild CKD (GFR = 60-89 mL/min/1 73 square meters)    Stage 3A Moderate CKD (GFR = 45-59 mL/min/1 73 square meters)    Stage 3B Moderate CKD (GFR = 30-44 mL/min/1 73 square meters)    Stage 4 Severe CKD (GFR = 15-29 mL/min/1 73 square meters)    Stage 5 End Stage CKD (GFR <15 mL/min/1 73 square meters)  Note: GFR calculation is accurate only with a steady state creatinine    CBC and differential [[id number]] Collected: 01/24/21 1751    Lab Status: Final result Specimen: Blood from Arm, Right Updated: 01/24/21 1800     WBC 5 20 Thousand/uL      RBC 4 33 Million/uL      Hemoglobin 12 3 g/dL      Hematocrit 38 7 %      MCV 89 fL      MCH 28 4 pg      MCHC 31 8 g/dL      RDW 12 5 %      MPV 10 1 fL      Platelets 262 Thousands/uL      nRBC 0 /100 WBCs      Neutrophils Relative 63 %      Immat GRANS % 0 %      Lymphocytes Relative 26 %      Monocytes Relative 9 %      Eosinophils Relative 2 %      Basophils Relative 0 %      Neutrophils Absolute 3 27 Thousands/µL      Immature Grans Absolute 0 01 Thousand/uL      Lymphocytes Absolute 1 37 Thousands/µL      Monocytes Absolute 0 45 Thousand/µL      Eosinophils Absolute 0 08 Thousand/µL      Basophils Absolute 0 02 Thousands/µL     UA w Reflex to Microscopic w Reflex to Culture [[id number]]  (Abnormal) Collected: 01/24/21 1752    Lab Status: Final result Specimen: Urine, Clean Catch Updated: 01/24/21 1758     Color, UA Light Yellow     Clarity, UA Slightly Cloudy     Specific Gravity, UA 1 025     pH, UA 7 0     Leukocytes, UA Small     Nitrite, UA Negative     Protein, UA Negative mg/dl      Glucose, UA Negative mg/dl      Ketones, UA Negative mg/dl      Urobilinogen, UA 0 2 E U /dl      Bilirubin, UA Negative     Blood, UA Small     URINE COMMENT --    Urine culture [[id number]] Collected: 01/24/21 1752    Lab Status: In process Specimen: Urine, Clean Catch Updated: 01/24/21 1758                 US OB < 14 weeks with transvaginal   Final Result by Sera Miller MD (01/24 2006)      Single live intrauterine gestation at 6 weeks 6 days (range +/- 4 days), based on crown-rump length  JUAN of September 13, 2021  No acute pathology seen        Suggest full fetal anatomic survey at about 20 weeks gestational age            Workstation performed: [id number] Procedures  Procedures         ED Course  ED Course as of  0933   Monica Bernard 2021   1840 Bacteria, UA(!): Moderate    Awaiting pelvic ultrasound read  Reading room called and notified  SBIRT 20yo+      Most Recent Value   SBIRT (22 yo +)   In order to provide better care to our patients, we are screening all of our patients for alcohol and drug use  Would it be okay to ask you these screening questions? Yes Filed at: 2021   Initial Alcohol Screen: US AUDIT-C    1  How often do you have a drink containing alcohol?  0 Filed at: 2021   2  How many drinks containing alcohol do you have on a typical day you are drinking? 0 Filed at: 2021   3a  Male UNDER 65: How often do you have five or more drinks on one occasion? 0 Filed at: 2021   3b  FEMALE Any Age, or MALE 65+: How often do you have 4 or more drinks on one occassion? 0 Filed at: 2021   Audit-C Score  0 Filed at: 2021   JANESSA: How many times in the past year have you    Used an illegal drug or used a prescription medication for non-medical reasons? Never Filed at: 2021                    MDM  Number of Diagnoses or Management Options  Abdominal pain in pregnancy, first trimester: new and requires workup  Urinary tract infection: new and requires workup  Vaginal bleeding in pregnancy, first trimester: new and requires workup  Diagnosis management comments: 32yo pregnant female currently 6 weeks by LMP presenting for abdominal cramping and vaginal bleeding  She had a TVUS last week but no fetal pole was identified due to early dates  She is afebrile and hemodynamically stable  Abdominal exam benign with mild tenderness in LLQ   Differential diagnosis includes but is not limited to: threatened , miscarriage, ectopic pregnancy    Initial ED plan: Check abdominal labs, quantitative HCG, blood typing, UA, and pelvic ultrasound  Final assessment: Labs unremarkable  Blood type is O positive, no need for Rhogam  Quantitative HCG is 48,000 which is consistent with dates  Pelvic ultrasound reveals live intrauterine pregnancy with no acute abnormality  UA with moderate bacteria  Given that she is pregnant, will treat with course of Keflex  No indication for further workup at this time  Advised OBGYN f/u  ED return precautions discussed  Patient expressed understanding and is agreeable to plan  Patient discharged in stable condition  Amount and/or Complexity of Data Reviewed  Clinical lab tests: ordered and reviewed  Tests in the radiology section of CPT®: ordered and reviewed  Review and summarize past medical records: yes  Independent visualization of images, tracings, or specimens: yes    Risk of Complications, Morbidity, and/or Mortality  Presenting problems: moderate  Diagnostic procedures: moderate  Management options: moderate    Patient Progress  Patient progress: stable      Disposition  Final diagnoses:   Vaginal bleeding in pregnancy, first trimester   Abdominal pain in pregnancy, first trimester   Urinary tract infection     Time reflects when diagnosis was documented in both MDM as applicable and the Disposition within this note     Time User Action Codes Description Comment    1/24/2021  8:17  SchemaLogicorah [O20 9] Vaginal bleeding in pregnancy, first trimester     1/24/2021  8:17  FishbowlKngrooorah [O26 891,  R10 9] Abdominal pain in pregnancy, first trimester     1/24/2021  8:18 PM [facility] FishbowlKngrooorah [N39 0] Urinary tract infection       ED Disposition     ED Disposition Condition Date/Time Comment    Discharge Stable Sun Jan 24, 2021  8:17 PM Sultana Baker discharge to home/self care              Follow-up Information     Follow up With Specialties Details Why Contact Info [address]  Obstetrics and Gynecology Schedule an appointment as soon as possible for a visit   [address] [phone] [address] [facility], [facility], [address] [place] [address]   [phone]    [facility] Emergency Medicine  If symptoms worsen [address] Emergency Department, [address] [place] [address], [place], [place], [address]          Discharge Medication List as of 1/24/2021  8:19 PM      START taking these medications    Details   cephalexin (KEFLEX) 500 mg capsule Take 1 capsule (500 mg total) by mouth every 12 (twelve) hours for 7 days, Starting Sun 1/24/2021, Until Sun 1/31/2021, Normal         CONTINUE these medications which have NOT CHANGED    Details   brompheniramine-pseudoephedrine-DM 30-2-10 MG/5ML syrup Take 5 mL by mouth 4 (four) times a day as needed for allergies, Starting Mon 2/17/2020, Normal      fluticasone (FLONASE) 50 mcg/act nasal spray 1 spray into each nostril daily, Starting Mon 2/17/2020, Normal      Levonorgest-Eth Estrad 91-Day (CAMRESE) 0 15-0 03 &0 01 MG TABS Take 1 tablet by mouth, Starting Fri 9/21/2018, Historical Med      Melatonin 10 MG TABS Take by mouth, Historical Med      methylphenidate (CONCERTA) 54 MG ER tablet take 1 tablet by mouth once daily, Historical Med      VITAMIN D PO Take by mouth, Starting Fri 2/13/2015, Historical Med           No discharge procedures on file      PDMP Review     None          ED Provider  Electronically Signed by           Patricia Ascencio PA-C  01/25/21 0560

## 2021-01-25 NOTE — DISCHARGE INSTRUCTIONS
Take antibiotic as prescribed for the bacteria in your urine  Follow-up with your OBGYN this week  Return to ER with any worsening symptoms

## 2021-01-26 LAB
BACTERIA UR CULT: ABNORMAL
BACTERIA UR CULT: ABNORMAL

## 2022-01-19 ENCOUNTER — APPOINTMENT (OUTPATIENT)
Dept: LAB | Facility: CLINIC | Age: 27
End: 2022-01-19
Payer: COMMERCIAL

## 2022-01-19 DIAGNOSIS — Z34.01 ENCOUNTER FOR SUPERVISION OF NORMAL FIRST PREGNANCY IN FIRST TRIMESTER: ICD-10-CM

## 2022-01-19 LAB
BASOPHILS # BLD AUTO: 0.03 THOUSANDS/ΜL (ref 0–0.1)
BASOPHILS NFR BLD AUTO: 1 % (ref 0–1)
EOSINOPHIL # BLD AUTO: 0.06 THOUSAND/ΜL (ref 0–0.61)
EOSINOPHIL NFR BLD AUTO: 1 % (ref 0–6)
ERYTHROCYTE [DISTWIDTH] IN BLOOD BY AUTOMATED COUNT: 15.4 % (ref 11.6–15.1)
HCT VFR BLD AUTO: 35.9 % (ref 34.8–46.1)
HGB BLD-MCNC: 11 G/DL (ref 11.5–15.4)
IMM GRANULOCYTES # BLD AUTO: 0.01 THOUSAND/UL (ref 0–0.2)
IMM GRANULOCYTES NFR BLD AUTO: 0 % (ref 0–2)
LYMPHOCYTES # BLD AUTO: 1.64 THOUSANDS/ΜL (ref 0.6–4.47)
LYMPHOCYTES NFR BLD AUTO: 30 % (ref 14–44)
MCH RBC QN AUTO: 25.5 PG (ref 26.8–34.3)
MCHC RBC AUTO-ENTMCNC: 30.6 G/DL (ref 31.4–37.4)
MCV RBC AUTO: 83 FL (ref 82–98)
MONOCYTES # BLD AUTO: 0.39 THOUSAND/ΜL (ref 0.17–1.22)
MONOCYTES NFR BLD AUTO: 7 % (ref 4–12)
NEUTROPHILS # BLD AUTO: 3.39 THOUSANDS/ΜL (ref 1.85–7.62)
NEUTS SEG NFR BLD AUTO: 61 % (ref 43–75)
NRBC BLD AUTO-RTO: 0 /100 WBCS
PLATELET # BLD AUTO: 315 THOUSANDS/UL (ref 149–390)
PMV BLD AUTO: 10 FL (ref 8.9–12.7)
RBC # BLD AUTO: 4.31 MILLION/UL (ref 3.81–5.12)
WBC # BLD AUTO: 5.52 THOUSAND/UL (ref 4.31–10.16)

## 2022-01-19 PROCEDURE — 86706 HEP B SURFACE ANTIBODY: CPT

## 2022-01-19 PROCEDURE — 36415 COLL VENOUS BLD VENIPUNCTURE: CPT

## 2022-01-19 PROCEDURE — 86803 HEPATITIS C AB TEST: CPT

## 2022-01-19 PROCEDURE — 80081 OBSTETRIC PANEL INC HIV TSTG: CPT

## 2022-01-20 LAB
ABO GROUP BLD: NORMAL
BLD GP AB SCN SERPL QL: NEGATIVE
HBV SURFACE AB SER-ACNC: 147.73 MIU/ML
HBV SURFACE AG SER QL: NORMAL
HCV AB SER QL: NORMAL
HIV 1+2 AB+HIV1 P24 AG SERPL QL IA: NORMAL
RH BLD: POSITIVE
RPR SER QL: NORMAL
RUBV IGG SERPL IA-ACNC: 10.6 IU/ML
SPECIMEN EXPIRATION DATE: NORMAL

## 2022-05-11 ENCOUNTER — APPOINTMENT (OUTPATIENT)
Dept: LAB | Facility: CLINIC | Age: 27
End: 2022-05-11
Payer: COMMERCIAL

## 2022-05-11 DIAGNOSIS — Z34.02 ENCOUNTER FOR SUPERVISION OF NORMAL FIRST PREGNANCY IN SECOND TRIMESTER: ICD-10-CM

## 2022-05-11 LAB
ERYTHROCYTE [DISTWIDTH] IN BLOOD BY AUTOMATED COUNT: 14.6 % (ref 11.6–15.1)
GLUCOSE 1H P 50 G GLC PO SERPL-MCNC: 93 MG/DL (ref 40–134)
HCT VFR BLD AUTO: 34.7 % (ref 34.8–46.1)
HGB BLD-MCNC: 10.4 G/DL (ref 11.5–15.4)
MCH RBC QN AUTO: 25.3 PG (ref 26.8–34.3)
MCHC RBC AUTO-ENTMCNC: 30 G/DL (ref 31.4–37.4)
MCV RBC AUTO: 84 FL (ref 82–98)
PLATELET # BLD AUTO: 359 THOUSANDS/UL (ref 149–390)
PMV BLD AUTO: 9.9 FL (ref 8.9–12.7)
RBC # BLD AUTO: 4.11 MILLION/UL (ref 3.81–5.12)
WBC # BLD AUTO: 6.11 THOUSAND/UL (ref 4.31–10.16)

## 2022-05-11 PROCEDURE — 86592 SYPHILIS TEST NON-TREP QUAL: CPT

## 2022-05-11 PROCEDURE — 36415 COLL VENOUS BLD VENIPUNCTURE: CPT

## 2022-05-11 PROCEDURE — 82950 GLUCOSE TEST: CPT

## 2022-05-11 PROCEDURE — 85027 COMPLETE CBC AUTOMATED: CPT

## 2022-05-12 LAB — RPR SER QL: NORMAL

## 2023-05-16 ENCOUNTER — APPOINTMENT (OUTPATIENT)
Dept: RADIOLOGY | Facility: AMBULARY SURGERY CENTER | Age: 28
End: 2023-05-16
Attending: STUDENT IN AN ORGANIZED HEALTH CARE EDUCATION/TRAINING PROGRAM

## 2023-05-16 ENCOUNTER — OFFICE VISIT (OUTPATIENT)
Dept: OBGYN CLINIC | Facility: CLINIC | Age: 28
End: 2023-05-16

## 2023-05-16 VITALS
HEART RATE: 160 BPM | DIASTOLIC BLOOD PRESSURE: 72 MMHG | HEIGHT: 62 IN | SYSTOLIC BLOOD PRESSURE: 102 MMHG | WEIGHT: 181.8 LBS | BODY MASS INDEX: 33.45 KG/M2

## 2023-05-16 DIAGNOSIS — M25.531 RIGHT WRIST PAIN: ICD-10-CM

## 2023-05-16 DIAGNOSIS — M67.431 GANGLION CYST OF VOLAR ASPECT OF RIGHT WRIST: Primary | ICD-10-CM

## 2023-05-16 RX ORDER — DIVALPROEX SODIUM 500 MG/1
TABLET, EXTENDED RELEASE ORAL
COMMUNITY
Start: 2023-03-31 | End: 2023-05-24

## 2023-05-16 NOTE — PROGRESS NOTES
ORTHOPAEDIC HAND, WRIST, AND ELBOW OFFICE  VISIT       ASSESSMENT/PLAN:      Diagnoses and all orders for this visit:    Ganglion cyst of volar aspect of right wrist  -     US msk guidance; Future    Right wrist pain  -     XR wrist 3+ vw right; Future    Other orders  -     divalproex sodium (DEPAKOTE ER) 500 mg 24 hr tablet; take 2 tablets by mouth once daily at bedtime for 21 DAYS        60-year-old female with right volar wrist mass, suspect ganglion cyst    · Non operative versus surgical intervention was discussed  We discussed aspiration however, due to the proximity to the radial artery I would like this to be performed under ultrasound guidance  · The patient elected to proceed with non operative treatment and a order was placed for US guided aspiration  · Recurrence rate was discussed      The patient verbalized understanding of exam findings and treatment plan  We engaged in the shared decision-making process and treatment options were discussed at length with the patient  Surgical and conservative management discussed today along with risks and benefits  Follow Up:  2 months       To Do Next Visit:  Re-evaluation of current issue    General Discussions:  Ganglion Cysts: The anatomy and physiology of the ganglion was discussed with the patient today in the office  Fluid leaking out of the joint surface typically creates a small sac, which can enlarge and cause pain or limitation of motion  Treatment options include observation, aspiration, or surgical incision were discussed with the patient today  Observation typically lead to resolution and approximately 10% of patients, aspiration results in resolution of approximately 50% of patients, and surgical excision leads to resolution in approximately 97% of patients  After discussion with the patient today, the patient voiced understanding of all treatment options        Juan Santiago MD  Attending, Orthopaedic Surgery  Hand, Wrist, and Elbow [address]    ____________________________________________________________________________________________________________________________________________      CHIEF COMPLAINT:  Chief Complaint   Patient presents with   • Right Wrist - Pain       SUBJECTIVE:  Patient is a 33 y o  RHD female who presents today for evaluation and treatment of right wrist mass  The patient notes a mass to the volar aspect of her wrist which has been ongoing for the past 2-3 months  She denies any known injury or trauma but states she started carrying her son more  She notes intermittent pain that is increased with activity  She denies any numbness or tingling  She denies prior surgery  The patient is a              I have personally reviewed all the relevant PMH, PSH, SH, FH, Medications and allergies      PAST MEDICAL HISTORY:  Past Medical History:   Diagnosis Date   • ADHD (attention deficit hyperactivity disorder)    • Anxiety    • Depression    • PTSD (post-traumatic stress disorder)        PAST SURGICAL HISTORY:  Past Surgical History:   Procedure Laterality Date   • KNEE SURGERY     • WISDOM TOOTH EXTRACTION     • WRIST SURGERY         FAMILY HISTORY:  Family History   Adopted: Yes       SOCIAL HISTORY:  Social History     Tobacco Use   • Smoking status: Former   • Smokeless tobacco: Never   Vaping Use   • Vaping Use: Never used   Substance Use Topics   • Alcohol use: Not Currently   • Drug use: Not Currently       MEDICATIONS:    Current Outpatient Medications:   •  brompheniramine-pseudoephedrine-DM 30-2-10 MG/5ML syrup, Take 5 mL by mouth 4 (four) times a day as needed for allergies, Disp: 118 mL, Rfl: 0  •  divalproex sodium (DEPAKOTE ER) 500 mg 24 hr tablet, take 2 tablets by mouth once daily at bedtime for 21 DAYS, Disp: , Rfl:   •  fluticasone (FLONASE) 50 mcg/act nasal spray, 1 spray into each nostril daily, Disp: 16 g, Rfl: 0  •  Levonorgest-Eth Estrad 91-Day 0 15-0 03 &0 01 MG TABS, Take 1 tablet by mouth, Disp: , Rfl:   •  Melatonin 10 MG TABS, Take by mouth, Disp: , Rfl:   •  methylphenidate (CONCERTA) 54 MG ER tablet, take 1 tablet by mouth once daily, Disp: , Rfl:   •  VITAMIN D PO, Take by mouth, Disp: , Rfl:     ALLERGIES:  Allergies   Allergen Reactions   • Bupropion Other (See Comments)   • Lamotrigine            REVIEW OF SYSTEMS:  Musculoskeletal:        As noted in HPI  All other systems reviewed and are negative  VITALS:  Vitals:    05/16/23 1020   BP: 102/72   Pulse: (!) 160       LABS:  HgA1c: No results found for: HGBA1C  BMP:   Lab Results   Component Value Date    CALCIUM 9 0 01/24/2021    K 3 9 01/24/2021    CO2 28 01/24/2021     01/24/2021    BUN 14 01/24/2021    CREATININE 0 54 (L) 01/24/2021       _____________________________________________________  PHYSICAL EXAMINATION:  General: well developed and well nourished, alert, oriented times 3 and appears comfortable  Psychiatric: Normal  HEENT: Normocephalic, Atraumatic Trachea Midline, No torticollis  Pulmonary: No audible wheezing or respiratory distress   Abdomen/GI: Non tender, non distended   Cardiovascular: No pitting edema, 2+ radial pulse   Skin: No Erythema, No Lacerations, No Fluctuation, No Ulcerations  Neurovascular: Sensation Intact to the Median, Ulnar, Radial Nerve, Motor Intact to the Median, Ulnar, Radial Nerve and Pulses Intact  Musculoskeletal: Normal, except as noted in detailed exam and in HPI        MUSCULOSKELETAL EXAMINATION:  Right wrist   8mm by 8mm by 3 mm high volar mass, feels firm  Full fist  Full ROM  Compartments soft  Brisk capillary refill   ___________________________________________________  STUDIES REVIEWED:  Xrays of the right wrist were reviewed in PACS by Dr Sivan Hood and demonstrate no osseous abnormalities         PROCEDURES PERFORMED:  Procedures  No Procedures performed today    _____________________________________________________      Azar Marquez    I,: Navya Capone MA am acting as a scribe while in the presence of the attending physician :       I,:  Viki Mcconnell MD personally performed the services described in this documentation    as scribed in my presence :

## 2023-05-24 ENCOUNTER — OFFICE VISIT (OUTPATIENT)
Dept: INTERNAL MEDICINE CLINIC | Facility: CLINIC | Age: 28
End: 2023-05-24

## 2023-05-24 VITALS
WEIGHT: 183.6 LBS | OXYGEN SATURATION: 98 % | HEART RATE: 85 BPM | SYSTOLIC BLOOD PRESSURE: 116 MMHG | BODY MASS INDEX: 34.66 KG/M2 | TEMPERATURE: 99.1 F | HEIGHT: 61 IN | DIASTOLIC BLOOD PRESSURE: 76 MMHG

## 2023-05-24 DIAGNOSIS — Z13.1 SCREENING FOR DIABETES MELLITUS: ICD-10-CM

## 2023-05-24 DIAGNOSIS — F90.0 ADHD (ATTENTION DEFICIT HYPERACTIVITY DISORDER), INATTENTIVE TYPE: ICD-10-CM

## 2023-05-24 DIAGNOSIS — Z00.00 ANNUAL PHYSICAL EXAM: Primary | ICD-10-CM

## 2023-05-24 DIAGNOSIS — F31.9 BIPOLAR 1 DISORDER (HCC): ICD-10-CM

## 2023-05-24 DIAGNOSIS — F41.9 ANXIETY AND DEPRESSION: ICD-10-CM

## 2023-05-24 DIAGNOSIS — J30.2 SEASONAL ALLERGIES: ICD-10-CM

## 2023-05-24 DIAGNOSIS — E66.09 CLASS 1 OBESITY DUE TO EXCESS CALORIES WITHOUT SERIOUS COMORBIDITY WITH BODY MASS INDEX (BMI) OF 33.0 TO 33.9 IN ADULT: ICD-10-CM

## 2023-05-24 DIAGNOSIS — M67.431 GANGLION CYST OF VOLAR ASPECT OF RIGHT WRIST: ICD-10-CM

## 2023-05-24 DIAGNOSIS — Z13.220 SCREENING FOR HYPERLIPIDEMIA: ICD-10-CM

## 2023-05-24 DIAGNOSIS — F43.10 PTSD (POST-TRAUMATIC STRESS DISORDER): ICD-10-CM

## 2023-05-24 DIAGNOSIS — E55.9 VITAMIN D DEFICIENCY: ICD-10-CM

## 2023-05-24 DIAGNOSIS — Z79.899 MEDICAL MARIJUANA USE: ICD-10-CM

## 2023-05-24 DIAGNOSIS — F32.A ANXIETY AND DEPRESSION: ICD-10-CM

## 2023-05-24 PROBLEM — L20.89 OTHER ATOPIC DERMATITIS: Status: ACTIVE | Noted: 2023-05-24

## 2023-05-24 PROBLEM — Z30.09 COUNSELING FOR BIRTH CONTROL REGARDING INTRAUTERINE DEVICE (IUD): Status: RESOLVED | Noted: 2022-07-29 | Resolved: 2023-05-24

## 2023-05-24 PROBLEM — Z30.430 ENCOUNTER FOR IUD INSERTION: Status: RESOLVED | Noted: 2022-09-28 | Resolved: 2023-05-24

## 2023-05-24 PROBLEM — L20.89 OTHER ATOPIC DERMATITIS: Status: RESOLVED | Noted: 2023-05-24 | Resolved: 2023-05-24

## 2023-05-24 PROBLEM — E66.811 CLASS 1 OBESITY DUE TO EXCESS CALORIES WITHOUT SERIOUS COMORBIDITY WITH BODY MASS INDEX (BMI) OF 33.0 TO 33.9 IN ADULT: Status: ACTIVE | Noted: 2021-12-27

## 2023-05-24 PROBLEM — Z30.09 COUNSELING FOR BIRTH CONTROL REGARDING INTRAUTERINE DEVICE (IUD): Status: ACTIVE | Noted: 2022-07-29

## 2023-05-24 PROBLEM — S70.12XA HEMATOMA OF LEFT THIGH: Status: RESOLVED | Noted: 2021-11-22 | Resolved: 2023-05-24

## 2023-05-24 PROBLEM — Z30.430 ENCOUNTER FOR IUD INSERTION: Status: ACTIVE | Noted: 2022-09-28

## 2023-05-24 PROBLEM — S70.12XA HEMATOMA OF LEFT THIGH: Status: ACTIVE | Noted: 2021-11-22

## 2023-05-24 RX ORDER — ARIPIPRAZOLE 5 MG/1
5 TABLET ORAL
COMMUNITY
Start: 2023-04-20

## 2023-05-24 RX ORDER — BUSPIRONE HYDROCHLORIDE 5 MG/1
5 TABLET ORAL 3 TIMES DAILY
COMMUNITY
Start: 2023-05-18

## 2023-05-24 RX ORDER — FOLIC ACID 1 MG/1
1000 TABLET ORAL DAILY
COMMUNITY
Start: 2023-04-20

## 2023-05-24 NOTE — PATIENT INSTRUCTIONS
Wellness Visit for Adults   AMBULATORY CARE:   A wellness visit  is when you see your healthcare provider to get screened for health problems  Your healthcare provider will also give you advice on how to stay healthy  Write down your questions so you remember to ask them  Ask your healthcare provider how often you should have a wellness visit  What happens at a wellness visit:  Your healthcare provider will ask about your health, and your family history of health problems  This includes high blood pressure, heart disease, and cancer  He or she will ask if you have symptoms that concern you, if you smoke, and about your mood  You may also be asked about your intake of medicines, supplements, food, and alcohol  Any of the following may be done: Your weight  will be checked  Your height may also be checked so your body mass index (BMI) can be calculated  Your BMI shows if you are at a healthy weight  Your blood pressure  and heart rate will be checked  Your temperature may also be checked  Blood and urine tests  may be done  Blood tests may be done to check your cholesterol levels  Abnormal cholesterol levels increase your risk for heart disease and stroke  You may also need a blood or urine test to check for diabetes if you are at increased risk  Urine tests may be done to look for signs of an infection or kidney disease  A physical exam  includes checking your heartbeat and lungs with a stethoscope  Your healthcare provider may also check your skin to look for sun damage  Screening tests  may be recommended  A screening test is done to check for diseases that may not cause symptoms  The screening tests you may need depend on your age, gender, family history, and lifestyle habits  For example, colorectal screening may be recommended if you are 48years old or older  Screening tests you need if you are a woman:   A Pap smear  is used to screen for cervical cancer   Pap smears are usually done every 3 to 5 years depending on your age  You may need them more often if you have had abnormal Pap smear test results in the past  Ask your healthcare provider how often you should have a Pap smear  A mammogram  is an x-ray of your breasts to screen for breast cancer  Experts recommend mammograms every 2 years starting at age 48 years  You may need a mammogram at age 52 years or younger if you have an increased risk for breast cancer  Talk to your healthcare provider about when you should start having mammograms and how often you need them  Vaccines you may need:   Get an influenza vaccine  every year  The influenza vaccine protects you from the flu  Several types of viruses cause the flu  The viruses change over time, so new vaccines are made each year  Get a tetanus-diphtheria (Td) booster vaccine  every 10 years  This vaccine protects you against tetanus and diphtheria  Tetanus is a severe infection that may cause painful muscle spasms and lockjaw  Diphtheria is a severe bacterial infection that causes a thick covering in the back of your mouth and throat  Get a human papillomavirus (HPV) vaccine  if you are female and aged 23 to 32 or male 23 to 24 and never received it  This vaccine protects you from HPV infection  HPV is the most common infection spread by sexual contact  HPV may also cause vaginal, penile, and anal cancers  Get a pneumococcal vaccine  if you are aged 72 years or older  The pneumococcal vaccine is an injection given to protect you from pneumococcal disease  Pneumococcal disease is an infection caused by pneumococcal bacteria  The infection may cause pneumonia, meningitis, or an ear infection  Get a shingles vaccine  if you are 60 or older, even if you have had shingles before  The shingles vaccine is an injection to protect you from the varicella-zoster virus  This is the same virus that causes chickenpox   Shingles is a painful rash that develops in people who had chickenpox or have been exposed to the virus  How to eat healthy:  My Plate is a model for planning healthy meals  It shows the types and amounts of foods that should go on your plate  Fruits and vegetables make up about half of your plate, and grains and protein make up the other half  A serving of dairy is included on the side of your plate  The amount of calories and serving sizes you need depends on your age, gender, weight, and height  Examples of healthy foods are listed below:  Eat a variety of vegetables  such as dark green, red, and orange vegetables  You can also include canned vegetables low in sodium (salt) and frozen vegetables without added butter or sauces  Eat a variety of fresh fruits , canned fruit in 100% juice, frozen fruit, and dried fruit  Include whole grains  At least half of the grains you eat should be whole grains  Examples include whole-wheat bread, wheat pasta, brown rice, and whole-grain cereals such as oatmeal     Eat a variety of protein foods such as seafood (fish and shellfish), lean meat, and poultry without skin (turkey and chicken)  Examples of lean meats include pork leg, shoulder, or tenderloin, and beef round, sirloin, tenderloin, and extra lean ground beef  Other protein foods include eggs and egg substitutes, beans, peas, soy products, nuts, and seeds  Choose low-fat dairy products such as skim or 1% milk or low-fat yogurt, cheese, and cottage cheese  Limit unhealthy fats  such as butter, hard margarine, and shortening  Exercise:  Exercise at least 30 minutes per day on most days of the week  Some examples of exercise include walking, biking, dancing, and swimming  You can also fit in more physical activity by taking the stairs instead of the elevator or parking farther away from stores  Include muscle strengthening activities 2 days each week  Regular exercise provides many health benefits   It helps you manage your weight, and decreases your risk for type 2 diabetes, heart disease, stroke, and high blood pressure  Exercise can also help improve your mood  Ask your healthcare provider about the best exercise plan for you  General health and safety guidelines:   Do not smoke  Nicotine and other chemicals in cigarettes and cigars can cause lung damage  Ask your healthcare provider for information if you currently smoke and need help to quit  E-cigarettes or smokeless tobacco still contain nicotine  Talk to your healthcare provider before you use these products  Limit alcohol  A drink of alcohol is 12 ounces of beer, 5 ounces of wine, or 1½ ounces of liquor  Lose weight, if needed  Being overweight increases your risk of certain health conditions  These include heart disease, high blood pressure, type 2 diabetes, and certain types of cancer  Protect your skin  Do not sunbathe or use tanning beds  Use sunscreen with a SPF 15 or higher  Apply sunscreen at least 15 minutes before you go outside  Reapply sunscreen every 2 hours  Wear protective clothing, hats, and sunglasses when you are outside  Drive safely  Always wear your seatbelt  Make sure everyone in your car wears a seatbelt  A seatbelt can save your life if you are in an accident  Do not use your cell phone when you are driving  This could distract you and cause an accident  Pull over if you need to make a call or send a text message  Practice safe sex  Use latex condoms if are sexually active and have more than one partner  Your healthcare provider may recommend screening tests for sexually transmitted infections (STIs)  Wear helmets, lifejackets, and protective gear  Always wear a helmet when you ride a bike or motorcycle, go skiing, or play sports that could cause a head injury  Wear protective equipment when you play sports  Wear a lifejacket when you are on a boat or doing water sports      © Copyright [employer] 2022 Information is for End User's use only and may not be sold, redistributed or otherwise used for commercial purposes  The above information is an  only  It is not intended as medical advice for individual conditions or treatments  Talk to your doctor, nurse or pharmacist before following any medical regimen to see if it is safe and effective for you     _______________________________________    Problem List Items Addressed This Visit          Other    ADHD (attention deficit hyperactivity disorder), inattentive type     Patient has Based upon the Diagnostic and Statistical Manual of Mental Health Disorders (DSM-5 guide for mental health disorders)    Patient has positive findings as listed on the Adult ADHD-RS-IV with Adult Prompts Document  This patient was educated on side effects, risks of taking this type of medication which include abuse, misuse, dependence, addiction and tolerance  All questions were answered including different dosing levels, increasing and decreasing of of this medication  We discussed their continued open discussions with the PCP in order to make sure that they are on the correct dose  We reviewed the potential side effects of the medications including, but are not limited to, constipation, diarrhea, nausea/upset stomach, drowsiness, fatigue, dizziness, urinary retention, visual changes, insomnia, headaches, nightmares, slowed breathing while sleeping, UTI issues, impaired judgment, addiction issues and the risk of fatal overdose if not taken as prescribed  We discussed the importance of notifying the office/provider immediately of any side effects  We discussed the importance of immediate notification if there are any feelings of suicidality thoughts or behaviors   We discussed the importance of contacting the office if he has any tachycardia or fainting situations  The patient was warned against driving while taking sedation medications  We discussed that sharing medications is a felony     We discussed other side effects such as QT prolongation, risks of Myocardial Infarction (heart attack), stroke and sudden death  We discussed immediate notification if there is any seizure-like activity  We discussed issues with angioedema as an anaphylactic reactions  Patient agrees to provide a Urine Sample for Toxicology purposes  I have personally reviewed the [address] [place] [address] ([facility]) website prior to prescribing this medication  The patient understands and agrees to use these medications only as prescribed  At this point in time, the patient is showing no signs of addiction, abuse, diversion or suicidal ideation  All the patient's questions were answered to their satisfaction  [place] Prescription Drug Monitoring Program report was reviewed and was appropriate     _____________________________________________________    How to take this test:    Choose from: no/none; mild; moderate; severe  [place] the choice that applies  Once you have taken the test, resubmit the answers to us and we will enter them into your chart  1  He/She has had ________ findings listed under the carelessness topic:  Is making increased mistakes, rushing through work, not checking work, messy workspace  [place]: no/none; mild; moderate; severe  2  He/She has had _________ findings listed under the difficulty sustaining attention in activities topics:    Where he/she has trouble paying attention, able to stay focused on his/her work, takes longer to complete his/her tasks as normal, and has trouble remembering what he/she has read  [place]: no/none; mild; moderate; severe  3  He/She has had _________ findings listed under the does not listen topics:   Others have complained that he/she does not seem to listen or respond when spoken to, he/she needs people to repeat directions, and has noticed that he/she misses key parts of conversations  [place]: no/none; mild; moderate; severe     4  He/She has ________ findings listed under the no follow-through topic:    He/She has trouble finishing things such as work and chores, he/she does leave things half done or finished, he/she has trouble following instructions which are complex and multi steps, and he/she needs to frequently write things down or he/she will forget them  [place]: no/none; mild; moderate; severe  5  He/She has _________ findings listed under the can not organized topic:    He/She has trouble organizing tasks and prioritizing work and chores, he/she does require assistance of others to help plan for him/her, he/she does have trouble with time management, and he/she does procrastinate  [place]: no/none; mild; moderate; severe  6  He/She has _________ findings listed under the avoid/dislikes task requiring sustained mental effort topic:    He/She avoids tasks that are difficult or lengthy, he/she has to force himself/herself to finish these tasks, he/she finds it extremely hard, and he/she continues to procrastinate  [place]: no/none; mild; moderate; severe  7  He/She has __________ findings listed under the lose important items topic:  He/She does frequently lose things, and is always looking for important items  He/She does seem to get in trouble for this  He/She also seems to put items in a safe place that he/she is unable to find later  [place]: no/none; mild; moderate; severe  8  He/She has _______ findings listed under the easily distractible topic:  He/She is easily distracted by other conversations, television, radio  He/She does have to remain in isolation of some sort in order to get any work done  Finds it difficult to get back on track once he/she is off track, and will find other things to do rather than his/her chores or work  [place]: no/none; mild; moderate; severe  9   He/She has _______ findings listed under the forgetful in daily activities topic:  He/She forgets his/her daily routine items, and forgets to bring things to and from work on a daily basis  He/She does have to make numerous notes  [place]: no/none; mild; moderate; severe  10  He/She has _________ findings listed under the score was and fidgets topic:  He/She is unable to sit still, and always is fidgeting in his/her chair  He/She does tap and move his/her feet or pen or pencil  He/She does always seem to be fussing either with his/her hair or clothing and he/she states that he/she cannot stop moving it seems automatic  [place]: no/none; mild; moderate; severe  11  He/She has _________ findings listed under the can not stay seated topic:  He/She does have trouble sitting in 1 section/seat for any length of time  He/She states that he/she would rather be up and standing or able to walk rather than sitting  He/She does have to force himself/herself to stay seated  He/She does usually stay away from any requirements where he/she will need to be seated for long periods of time  [place]: no/none; mild; moderate; severe  12  He/She has _________ findings listed under the runs/climbs excessively topic:  He/She was feels physically restless, and does seem more agitated when he/she has made to sit still  [place]: no/none; mild; moderate; severe  13  He/She has severe findings listed under the can not play/work quietly topic:  He/She is unable to sit and read a book, needs to be doing some type of physical activity  [place]: no/none; mild; moderate; severe  14  He/She has _______ findings listed under the on the go, driven by a motor topic:  He/She feels hard to slow down, and that he/she always has a lot of energy and is always on the go  He/She is unable to relax  [place]: no/none; mild; moderate; severe  13  He/She has _______ findings listed under the talks excessively topic:  He/She talks a lot, all the time, definitely more than other people  He/She also finds herself to be much louder than other people  [place]: no/none; mild; moderate; severe     16  He/She has _________ findings listed under the blurts out answers topic:  He/She states that he/she does blurted out answers, is always having a hard time finding the rationale to wait his/her turn  [place]: no/none; mild; moderate; severe  16  He/She has __________ findings listed under the can not wait for turn topic:  Again he/she finds it hard to weight his/her turn, he/she feels frustrated when there are delays or when he/she has to wait in lines  [place]: no/none; mild; moderate; severe  25  He/She has ___________ findings listed under the intrudes/interrupts others topic:  He/She does butt into other people's conversations and seems to interrupt others  [place]: no/none; mild; moderate; severe  Relevant Medications    ARIPiprazole (ABILIFY) 5 mg tablet    busPIRone (BUSPAR) 5 mg tablet    Anxiety and depression     In the past she was on:  10/19/2022  Zoloft 50mg daily  3/31/2023  Depakote 1000mg at HS for 21 days  Follows with John Smith MD for psychiatry  Last seen on 4/20/2023  He is handling the following medications:  Abilify 5mg at bedtime  Folic acid 1mg daily          Relevant Medications    ARIPiprazole (ABILIFY) 5 mg tablet    busPIRone (BUSPAR) 5 mg tablet    Class 1 obesity due to excess calories without serious comorbidity with body mass index (BMI) of 33 0 to 33 9 in adult    Seasonal allergies    Ganglion cyst of volar aspect of right wrist     5/16/2023 Ortho  Non operative versus surgical intervention was discussed  We discussed aspiration however, due to the proximity to the radial artery I would like this to be performed under ultrasound guidance     The patient elected to proceed with non operative treatment and a order was placed for US guided aspiration  Recurrence rate was discussed           Vitamin D deficiency    Relevant Orders    Vitamin D 25 hydroxy    Annual physical exam - Primary     Lifestyle modification, diet and exercise discussed  Medications discussed and refilled appropriately  Labs discussed and ordered   Hepatitis C screening discussed  HIV screening discussed  Depression screening performed  Anxiety screening performed  BMI discussed             Relevant Orders    CBC and Platelet    Comprehensive metabolic panel    Screening for diabetes mellitus    Relevant Orders    HEMOGLOBIN A1C W/ EAG ESTIMATION    Screening for hyperlipidemia    Relevant Orders    Lipid panel    Bipolar 1 disorder (HCC)    Relevant Medications    ARIPiprazole (ABILIFY) 5 mg tablet    busPIRone (BUSPAR) 5 mg tablet    PTSD (post-traumatic stress disorder)    Relevant Medications    ARIPiprazole (ABILIFY) 5 mg tablet    busPIRone (BUSPAR) 5 mg tablet    Medical marijuana use

## 2023-05-24 NOTE — ASSESSMENT & PLAN NOTE
· 5/16/2023 Ortho  • Non operative versus surgical intervention was discussed  We discussed aspiration however, due to the proximity to the radial artery I would like this to be performed under ultrasound guidance     • The patient elected to proceed with non operative treatment and a order was placed for US guided aspiration  • Recurrence rate was discussed

## 2023-05-24 NOTE — ASSESSMENT & PLAN NOTE
· In the past she was on:  · 10/19/2022  · Zoloft 50mg daily  · 3/31/2023  · Depakote 1000mg at HS for 21 days  · Follows with Cristian Majano MD for psychiatry  · Last seen on 4/20/2023  · He is handling the following medications:  · Abilify 5mg at bedtime  · Folic acid 1mg daily

## 2023-05-24 NOTE — PROGRESS NOTES
ADULT ANNUAL PHYSICAL  [address] [facility]    NAME: Carlos Galvan  AGE: 33 y o  SEX: female  : 1995     DATE: 2023     Assessment and Plan:     Problem List Items Addressed This Visit        Other    ADHD (attention deficit hyperactivity disorder), inattentive type     Patient has Based upon the Diagnostic and Statistical Manual of Mental Health Disorders (DSM-5 guide for mental health disorders)    Patient has positive findings as listed on the Adult ADHD-RS-IV with Adult Prompts Document  This patient was educated on side effects, risks of taking this type of medication which include abuse, misuse, dependence, addiction and tolerance  All questions were answered including different dosing levels, increasing and decreasing of of this medication  We discussed their continued open discussions with the PCP in order to make sure that they are on the correct dose  We reviewed the potential side effects of the medications including, but are not limited to, constipation, diarrhea, nausea/upset stomach, drowsiness, fatigue, dizziness, urinary retention, visual changes, insomnia, headaches, nightmares, slowed breathing while sleeping, UTI issues, impaired judgment, addiction issues and the risk of fatal overdose if not taken as prescribed  We discussed the importance of notifying the office/provider immediately of any side effects  We discussed the importance of immediate notification if there are any feelings of suicidality thoughts or behaviors   We discussed the importance of contacting the office if he has any tachycardia or fainting situations  The patient was warned against driving while taking sedation medications  We discussed that sharing medications is a felony  We discussed other side effects such as QT prolongation, risks of Myocardial Infarction (heart attack), stroke and sudden death     We discussed immediate notification if there is any seizure-like activity  We discussed issues with angioedema as an anaphylactic reactions  Patient agrees to provide a Urine Sample for Toxicology purposes  I have personally reviewed the [address] [place] [address] ([facility]) website prior to prescribing this medication  The patient understands and agrees to use these medications only as prescribed  At this point in time, the patient is showing no signs of addiction, abuse, diversion or suicidal ideation  All the patient's questions were answered to their satisfaction  [place] Pres[facility] Drug Monitoring Program report was reviewed and was appropriate     _____________________________________________________    How to take this test:    Choose from: no/none; mild; moderate; severe  [place] the choice that applies  Once you have taken the test, resubmit the answers to us and we will enter them into your chart  1  He/She has had ________ findings listed under the carelessness topic:  Is making increased mistakes, rushing through work, not checking work, messy workspace  [place]: no/none; mild; moderate; severe  2  He/She has had _________ findings listed under the difficulty sustaining attention in activities topics:    Where he/she has trouble paying attention, able to stay focused on his/her work, takes longer to complete his/her tasks as normal, and has trouble remembering what he/she has read  [place]: no/none; mild; moderate; severe  3  He/She has had _________ findings listed under the does not listen topics:   Others have complained that he/she does not seem to listen or respond when spoken to, he/she needs people to repeat directions, and has noticed that he/she misses key parts of conversations  [place]: no/none; mild; moderate; severe     4  He/She has ________ findings listed under the no follow-through topic:    He/She has trouble finishing things such as work and chores, he/she does leave things half done or finished, he/she has trouble following instructions which are complex and multi steps, and he/she needs to frequently write things down or he/she will forget them  [place]: no/none; mild; moderate; severe  5  He/She has _________ findings listed under the can not organized topic:    He/She has trouble organizing tasks and prioritizing work and chores, he/she does require assistance of others to help plan for him/her, he/she does have trouble with time management, and he/she does procrastinate  [place]: no/none; mild; moderate; severe  6  He/She has _________ findings listed under the avoid/dislikes task requiring sustained mental effort topic:    He/She avoids tasks that are difficult or lengthy, he/she has to force himself/herself to finish these tasks, he/she finds it extremely hard, and he/she continues to procrastinate  [place]: no/none; mild; moderate; severe  7  He/She has __________ findings listed under the lose important items topic:  He/She does frequently lose things, and is always looking for important items  He/She does seem to get in trouble for this  He/She also seems to put items in a safe place that he/she is unable to find later  [place]: no/none; mild; moderate; severe  8  He/She has _______ findings listed under the easily distractible topic:  He/She is easily distracted by other conversations, television, radio  He/She does have to remain in isolation of some sort in order to get any work done  Finds it difficult to get back on track once he/she is off track, and will find other things to do rather than his/her chores or work  [place]: no/none; mild; moderate; severe  9  He/She has _______ findings listed under the forgetful in daily activities topic:  He/She forgets his/her daily routine items, and forgets to bring things to and from work on a daily basis  He/She does have to make numerous notes  [place]: no/none; mild; moderate; severe     10  He/She has _________ findings listed under the score was and fidgets topic:  He/She is unable to sit still, and always is fidgeting in his/her chair  He/She does tap and move his/her feet or pen or pencil  He/She does always seem to be fussing either with his/her hair or clothing and he/she states that he/she cannot stop moving it seems automatic  [place]: no/none; mild; moderate; severe  11  He/She has _________ findings listed under the can not stay seated topic:  He/She does have trouble sitting in 1 section/seat for any length of time  He/She states that he/she would rather be up and standing or able to walk rather than sitting  He/She does have to force himself/herself to stay seated  He/She does usually stay away from any requirements where he/she will need to be seated for long periods of time  [place]: no/none; mild; moderate; severe  12  He/She has _________ findings listed under the runs/climbs excessively topic:  He/She was feels physically restless, and does seem more agitated when he/she has made to sit still  [place]: no/none; mild; moderate; severe  13  He/She has severe findings listed under the can not play/work quietly topic:  He/She is unable to sit and read a book, needs to be doing some type of physical activity  [place]: no/none; mild; moderate; severe  14  He/She has _______ findings listed under the on the go, driven by a motor topic:  He/She feels hard to slow down, and that he/she always has a lot of energy and is always on the go  He/She is unable to relax  [place]: no/none; mild; moderate; severe  13  He/She has _______ findings listed under the talks excessively topic:  He/She talks a lot, all the time, definitely more than other people  He/She also finds herself to be much louder than other people  [place]: no/none; mild; moderate; severe     12  He/She has _________ findings listed under the blurts out answers topic:  He/She states that he/she does blurted out answers, is always having a hard time finding the rationale to wait his/her turn  [place]: no/none; mild; moderate; severe  16  He/She has __________ findings listed under the can not wait for turn topic:  Again he/she finds it hard to weight his/her turn, he/she feels frustrated when there are delays or when he/she has to wait in lines  [place]: no/none; mild; moderate; severe  25  He/She has ___________ findings listed under the intrudes/interrupts others topic:  He/She does butt into other people's conversations and seems to interrupt others  [place]: no/none; mild; moderate; severe  Relevant Medications    ARIPiprazole (ABILIFY) 5 mg tablet    busPIRone (BUSPAR) 5 mg tablet    Anxiety and depression     In the past she was on:  10/19/2022  Zoloft 50mg daily  3/31/2023  Depakote 1000mg at HS for 21 days  Follows with Scott Fortune MD for psychiatry  Last seen on 4/20/2023  He is handling the following medications:  Abilify 5mg at bedtime  Folic acid 1mg daily          Relevant Medications    ARIPiprazole (ABILIFY) 5 mg tablet    busPIRone (BUSPAR) 5 mg tablet    Class 1 obesity due to excess calories without serious comorbidity with body mass index (BMI) of 33 0 to 33 9 in adult    Seasonal allergies    Ganglion cyst of volar aspect of right wrist     5/16/2023 Ortho  Non operative versus surgical intervention was discussed  We discussed aspiration however, due to the proximity to the radial artery I would like this to be performed under ultrasound guidance     The patient elected to proceed with non operative treatment and a order was placed for US guided aspiration  Recurrence rate was discussed           Vitamin D deficiency    Relevant Orders    Vitamin D 25 hydroxy    Annual physical exam - Primary     Lifestyle modification, diet and exercise discussed  Medications discussed and refilled appropriately  Labs discussed and ordered   Hepatitis C screening discussed  HIV screening discussed  Depression screening performed  Anxiety screening performed  BMI discussed             Relevant Orders    CBC and Platelet    Comprehensive metabolic panel    Screening for diabetes mellitus    Relevant Orders    HEMOGLOBIN A1C W/ EAG ESTIMATION    Screening for hyperlipidemia    Relevant Orders    Lipid panel    Bipolar 1 disorder (HCC)    Relevant Medications    ARIPiprazole (ABILIFY) 5 mg tablet    busPIRone (BUSPAR) 5 mg tablet    PTSD (post-traumatic stress disorder)    Relevant Medications    ARIPiprazole (ABILIFY) 5 mg tablet    busPIRone (BUSPAR) 5 mg tablet    Medical marijuana use       Immunizations and preventive care screenings were discussed with patient today  Appropriate education was printed on patient's after visit summary  Counseling:  Alcohol/drug use: discussed moderation in alcohol intake, the recommendations for healthy alcohol use, and avoidance of illicit drug use  Dental Health: discussed importance of regular tooth brushing, flossing, and dental visits  Injury prevention: discussed safety/seat belts, safety helmets, smoke detectors, carbon dioxide detectors, and smoking near bedding or upholstery  Sexual health: discussed sexually transmitted diseases, partner selection, use of condoms, avoidance of unintended pregnancy, and contraceptive alternatives  Exercise: the importance of regular exercise/physical activity was discussed  Recommend exercise 3-5 times per week for at least 30 minutes  BMI Counseling: Body mass index is 34 69 kg/m²  The BMI is above normal  Nutrition recommendations include decreasing portion sizes, encouraging healthy choices of fruits and vegetables, decreasing fast food intake, consuming healthier snacks, limiting drinks that contain sugar, moderation in carbohydrate intake, increasing intake of lean protein, reducing intake of saturated and trans fat and reducing intake of cholesterol  Exercise recommendations include exercising 3-5 times per week  No pharmacotherapy was ordered   Rationale for BMI follow-up plan is due to patient being overweight or obese  Depression Screening and Follow-up Plan: Patient assessed for underlying major depression  Brief counseling provided and recommend additional follow-up/re-evaluation next office visit  No follow-ups on file  Chief Complaint:     Chief Complaint   Patient presents with   • Annual Exam     Routine labs, DS,       History of Present Illness:     Adult Annual Physical   Patient here for a comprehensive physical exam  The patient reports problems - as discussed   Diet and Physical Activity  Diet/Nutrition: well balanced diet  Exercise: no formal exercise  Depression Screening  PHQ-2/9 Depression Screening    Little interest or pleasure in doing things: 1 - several days  Feeling down, depressed, or hopeless: 1 - several days  Trouble falling or staying asleep, or sleeping too much: 0 - not at all  Feeling tired or having little energy: 1 - several days  Poor appetite or overeatin - not at all  Feeling bad about yourself - or that you are a failure or have let yourself or your family down: 0 - not at all  Trouble concentrating on things, such as reading the newspaper or watching television: 0 - not at all  Moving or speaking so slowly that other people could have noticed  Or the opposite - being so fidgety or restless that you have been moving around a lot more than usual: 0 - not at all  Thoughts that you would be better off dead, or of hurting yourself in some way: 0 - not at all  PHQ-9 Score: 3   PHQ-9 Interpretation: No or Minimal depression        General Health  Sleep: sleeps poorly  Hearing: normal - bilateral   Vision: no vision problems  Dental: regular dental visits  /GYN Health  Last menstrual period:   Contraceptive method:   Darcus Maria Ines History of STDs?: no      Review of Systems:     Review of Systems   Constitutional: Negative for activity change, chills, fatigue and fever     HENT: Negative for rhinorrhea and sore throat  Eyes: Negative for pain  Respiratory: Negative for cough and shortness of breath  Cardiovascular: Negative for chest pain, palpitations and leg swelling  Gastrointestinal: Negative for abdominal pain, constipation, diarrhea, nausea and vomiting  Genitourinary: Negative for difficulty urinating, flank pain, frequency and urgency  Musculoskeletal: Negative for gait problem, joint swelling and myalgias  Skin: Negative for color change  Neurological: Negative for dizziness, weakness, light-headedness and headaches  Psychiatric/Behavioral: Positive for decreased concentration and sleep disturbance  The patient is nervous/anxious  All other systems reviewed and are negative       Past Medical History:     Past Medical History:   Diagnosis Date   • [facility]    • PTSD (post-traumatic stress disorder)       Past Surgical History:     Past Surgical History:   Procedure Laterality Date   • KNEE SURGERY     • WISDOM TOOTH EXTRACTION     • WRIST SURGERY        Social History:     Social History     Socioeconomic History   • Marital status: Single     Spouse name: None   • Number of children: None   • Years of education: None   • Highest education level: None   Occupational History   • None   Tobacco Use   • Smoking status: Former   • Smokeless tobacco: Never   Vaping Use   • Vaping Use: Never used   Substance and Sexual Activity   • Alcohol use: Not Currently   • Drug use: Yes     Types: Marijuana   • Sexual activity: Yes   Other Topics Concern   • None   Social History Narrative   • None     Social Determinants of Health     Financial Resource Strain: Not on file   Food Insecurity: Not on file   Transportation Needs: Not on file   Physical Activity: Not on file   Stress: Not on file   Social Connections: Not on file   Intimate Partner Violence: Not on file   Housing Stability: Not on file      Family History:     Family History   Adopted: Yes      Current Medications: "    Current Outpatient Medications   Medication Sig Dispense Refill   • ARIPiprazole (ABILIFY) 5 mg tablet Take 5 mg by mouth daily at bedtime     • busPIRone (BUSPAR) 5 mg tablet Take 5 mg by mouth 3 (three) times a day     • fluticasone (FLONASE) 50 mcg/act nasal spray 1 spray into each nostril daily 16 g 0   • folic acid (FOLVITE) 1 mg tablet Take 1,000 mcg by mouth daily     • Melatonin 10 MG TABS Take by mouth     • VITAMIN D PO Take by mouth       No current facility-administered medications for this visit  Allergies: Allergies   Allergen Reactions   • Lamotrigine Hives and Rash   • Divalproex Sodium (Migraine) [Valproic Acid] Hives   • Bupropion Other (See Comments)      Physical Exam:     /76 (BP Location: Left arm, Patient Position: Sitting, Cuff Size: Standard)   Pulse 85   Temp 99 1 °F (37 3 °C)   Ht 5' 1\" (1 549 m)   Wt 83 3 kg (183 lb 9 6 oz)   SpO2 98%   BMI 34 69 kg/m²     Physical Exam  Vitals and nursing note reviewed  Constitutional:       General: She is awake  She is not in acute distress  Appearance: Normal appearance  She is well-developed and overweight  HENT:      Head: Normocephalic and atraumatic  Nose: Nose normal       Mouth/Throat:      Mouth: Mucous membranes are moist    Eyes:      Conjunctiva/sclera: Conjunctivae normal    Cardiovascular:      Rate and Rhythm: Normal rate and regular rhythm  Pulses: Normal pulses  Heart sounds: Normal heart sounds  No murmur heard  Pulmonary:      Effort: Pulmonary effort is normal  No respiratory distress  Breath sounds: Normal breath sounds  Abdominal:      General: Bowel sounds are normal       Palpations: Abdomen is soft  Tenderness: There is no abdominal tenderness  Musculoskeletal:      Cervical back: Neck supple  Right lower leg: No edema  Left lower leg: No edema  Skin:     General: Skin is warm and dry     Neurological:      Mental Status: She is alert and oriented to " person, place, and time  Psychiatric:         Attention and Perception: Attention normal          Mood and Affect: Mood is anxious and depressed  Speech: Speech normal          Behavior: Behavior normal  Behavior is cooperative            FRANKIE Toscano   [facility]

## 2023-05-24 NOTE — ASSESSMENT & PLAN NOTE
Patient has Based upon the Diagnostic and Statistical Manual of Mental Health Disorders (DSM-5 guide for mental health disorders)    Patient has positive findings as listed on the Adult ADHD-RS-IV with Adult Prompts Document  This patient was educated on side effects, risks of taking this type of medication which include abuse, misuse, dependence, addiction and tolerance  All questions were answered including different dosing levels, increasing and decreasing of of this medication  We discussed their continued open discussions with the PCP in order to make sure that they are on the correct dose  We reviewed the potential side effects of the medications including, but are not limited to, constipation, diarrhea, nausea/upset stomach, drowsiness, fatigue, dizziness, urinary retention, visual changes, insomnia, headaches, nightmares, slowed breathing while sleeping, UTI issues, impaired judgment, addiction issues and the risk of fatal overdose if not taken as prescribed  We discussed the importance of notifying the office/provider immediately of any side effects  We discussed the importance of immediate notification if there are any feelings of suicidality thoughts or behaviors   We discussed the importance of contacting the office if he has any tachycardia or fainting situations  The patient was warned against driving while taking sedation medications  We discussed that sharing medications is a felony  We discussed other side effects such as QT prolongation, risks of Myocardial Infarction (heart attack), stroke and sudden death  We discussed immediate notification if there is any seizure-like activity  We discussed issues with angioedema as an anaphylactic reactions  Patient agrees to provide a Urine Sample for Toxicology purposes  I have personally reviewed the [facility] website prior to prescribing this medication    The patient understands and agrees to use these medications only as prescribed  At this point in time, the patient is showing no signs of addiction, abuse, diversion or suicidal ideation  All the patient's questions were answered to their satisfaction  [place] Prescription Drug Monitoring Program report was reviewed and was appropriate     _____________________________________________________    How to take this test:    Choose from: no/none; mild; moderate; severe  [place] the choice that applies  Once you have taken the test, resubmit the answers to us and we will enter them into your chart  1  He/She has had ________ findings listed under the carelessness topic:  Is making increased mistakes, rushing through work, not checking work, messy workspace  [place]: no/none; mild; moderate; severe  2  He/She has had _________ findings listed under the difficulty sustaining attention in activities topics:    Where he/she has trouble paying attention, able to stay focused on his/her work, takes longer to complete his/her tasks as normal, and has trouble remembering what he/she has read  [place]: no/none; mild; moderate; severe  3  He/She has had _________ findings listed under the does not listen topics:   Others have complained that he/she does not seem to listen or respond when spoken to, he/she needs people to repeat directions, and has noticed that he/she misses key parts of conversations  [place]: no/none; mild; moderate; severe  4  He/She has ________ findings listed under the no follow-through topic:    He/She has trouble finishing things such as work and chores, he/she does leave things half done or finished, he/she has trouble following instructions which are complex and multi steps, and he/she needs to frequently write things down or he/she will forget them  [place]: no/none; mild; moderate; severe     5  He/She has _________ findings listed under the can not organized topic:    He/She has trouble organizing tasks and prioritizing work and chores, he/she does require assistance of others to help plan for him/her, he/she does have trouble with time management, and he/she does procrastinate  [place]: no/none; mild; moderate; severe  6  He/She has _________ findings listed under the avoid/dislikes task requiring sustained mental effort topic:    He/She avoids tasks that are difficult or lengthy, he/she has to force himself/herself to finish these tasks, he/she finds it extremely hard, and he/she continues to procrastinate  [place]: no/none; mild; moderate; severe  7  He/She has __________ findings listed under the lose important items topic:  He/She does frequently lose things, and is always looking for important items  He/She does seem to get in trouble for this  He/She also seems to put items in a safe place that he/she is unable to find later  [place]: no/none; mild; moderate; severe  8  He/She has _______ findings listed under the easily distractible topic:  He/She is easily distracted by other conversations, television, radio  He/She does have to remain in isolation of some sort in order to get any work done  Finds it difficult to get back on track once he/she is off track, and will find other things to do rather than his/her chores or work  [place]: no/none; mild; moderate; severe  9  He/She has _______ findings listed under the forgetful in daily activities topic:  He/She forgets his/her daily routine items, and forgets to bring things to and from work on a daily basis  He/She does have to make numerous notes  [place]: no/none; mild; moderate; severe  10  He/She has _________ findings listed under the score was and fidgets topic:  He/She is unable to sit still, and always is fidgeting in his/her chair  He/She does tap and move his/her feet or pen or pencil    He/She does always seem to be fussing either with his/her hair or clothing and he/she states that he/she cannot stop moving it seems automatic  [place]: no/none; mild; moderate; severe  11  He/She has _________ findings listed under the can not stay seated topic:  He/She does have trouble sitting in 1 section/seat for any length of time  He/She states that he/she would rather be up and standing or able to walk rather than sitting  He/She does have to force himself/herself to stay seated  He/She does usually stay away from any requirements where he/she will need to be seated for long periods of time  [place]: no/none; mild; moderate; severe  12  He/She has _________ findings listed under the runs/climbs excessively topic:  He/She was feels physically restless, and does seem more agitated when he/she has made to sit still  [place]: no/none; mild; moderate; severe  13  He/She has severe findings listed under the can not play/work quietly topic:  He/She is unable to sit and read a book, needs to be doing some type of physical activity  [place]: no/none; mild; moderate; severe  14  He/She has _______ findings listed under the on the go, driven by a motor topic:  He/She feels hard to slow down, and that he/she always has a lot of energy and is always on the go  He/She is unable to relax  [place]: no/none; mild; moderate; severe  13  He/She has _______ findings listed under the talks excessively topic:  He/She talks a lot, all the time, definitely more than other people  He/She also finds herself to be much louder than other people  [place]: no/none; mild; moderate; severe  12  He/She has _________ findings listed under the blurts out answers topic:  He/She states that he/she does blurted out answers, is always having a hard time finding the rationale to wait his/her turn  [place]: no/none; mild; moderate; severe  16  He/She has __________ findings listed under the can not wait for turn topic:  Again he/she finds it hard to weight his/her turn, he/she feels frustrated when there are delays or when he/she has to wait in lines    [place]: no/none; mild; moderate; severe  25  He/She has ___________ findings listed under the intrudes/interrupts others topic:  He/She does butt into other people's conversations and seems to interrupt others  [place]: no/none; mild; moderate; severe

## 2023-07-18 ENCOUNTER — OFFICE VISIT (OUTPATIENT)
Dept: OBGYN CLINIC | Facility: CLINIC | Age: 28
End: 2023-07-18
Payer: COMMERCIAL

## 2023-07-18 VITALS — HEIGHT: 61 IN | BODY MASS INDEX: 35.12 KG/M2 | WEIGHT: 186 LBS

## 2023-07-18 DIAGNOSIS — M67.431 GANGLION CYST OF VOLAR ASPECT OF RIGHT WRIST: Primary | ICD-10-CM

## 2023-07-18 PROCEDURE — 99213 OFFICE O/P EST LOW 20 MIN: CPT | Performed by: STUDENT IN AN ORGANIZED HEALTH CARE EDUCATION/TRAINING PROGRAM

## 2023-07-18 NOTE — PROGRESS NOTES
ORTHOPAEDIC HAND, WRIST, AND ELBOW OFFICE  VISIT       ASSESSMENT/PLAN:      Diagnoses and all orders for this visit:    Ganglion cyst of volar aspect of right wrist  -     US msk guidance; Future          33 y.o. female with right wrist volar ganglion cyst  Treatment options and expected outcomes were discussed. The patient verbalized understanding of exam findings and treatment plan. The patient was given the opportunity to ask questions. Questions were answered to the patient's satisfaction. Non operative versus surgical intervention was discussed. The patient is not interested in surgical intervention at this time due to having to care for her son who is about a year old. The patient decided to move forward with US guided aspiration via shared decision making. Another order was placed for an US guided aspiration in the office today. Discussed possible excision if needed in the future. Follow Up:  PRN       To Do Next Visit:             Silvia Turner MD  Attending, Orthopaedic Surgery  Hand, Wrist, and Elbow Surgery  [facility]    ____________________________________________________________________________________________________________________________________________      CHIEF COMPLAINT:  Chief Complaint   Patient presents with   • Follow-up     Patient is starting to have more pain again          SUBJECTIVE:  Patient is a 33 y.o. RHD female who presents today for follow up of right volar wrist mass. The patient states the mass is unchanged in size however, is becoming more painful for her. She notes a constant throbbing pain that is increased with pressure. A US guided aspiration was ordered at her last visit however, did not have this performed.            I have personally reviewed all the relevant PMH, PSH, SH, FH, Medications and allergies      PAST MEDICAL HISTORY:  Past Medical History:   Diagnosis Date   • Bipolar 1 disorder ([address])    • PTSD (post-traumatic stress disorder)        PAST SURGICAL HISTORY:  Past Surgical History:   Procedure Laterality Date   • KNEE SURGERY     • WISDOM TOOTH EXTRACTION     • WRIST SURGERY         FAMILY HISTORY:  Family History   Adopted: Yes       SOCIAL HISTORY:  Social History     Tobacco Use   • Smoking status: Former   • Smokeless tobacco: Never   Vaping Use   • Vaping Use: Never used   Substance Use Topics   • Alcohol use: Not Currently   • Drug use: Yes     Types: Marijuana       MEDICATIONS:    Current Outpatient Medications:   •  ARIPiprazole (ABILIFY) 5 mg tablet, Take 5 mg by mouth daily at bedtime, Disp: , Rfl:   •  busPIRone (BUSPAR) 5 mg tablet, Take 5 mg by mouth 3 (three) times a day, Disp: , Rfl:   •  fluticasone (FLONASE) 50 mcg/act nasal spray, 1 spray into each nostril daily, Disp: 16 g, Rfl: 0  •  folic acid (FOLVITE) 1 mg tablet, Take 1,000 mcg by mouth daily, Disp: , Rfl:   •  Melatonin 10 MG TABS, Take by mouth, Disp: , Rfl:   •  VITAMIN D PO, Take by mouth, Disp: , Rfl:     ALLERGIES:  Allergies   Allergen Reactions   • Lamotrigine Hives and Rash   • Divalproex Sodium (Migraine) [Valproic Acid] Hives   • Bupropion Other (See Comments)           REVIEW OF SYSTEMS:  Musculoskeletal:        As noted in HPI. All other systems reviewed and are negative.     VITALS:  Vitals:       LABS:  HgA1c: No results found for: "HGBA1C"  BMP:   Lab Results   Component Value Date    CALCIUM 9.0 01/24/2021    K 3.9 01/24/2021    CO2 28 01/24/2021     01/24/2021    BUN 14 01/24/2021    CREATININE 0.54 (L) 01/24/2021       _____________________________________________________  PHYSICAL EXAMINATION:  General: Well developed and well nourished, alert & oriented x 3, appears comfortable  Psychiatric: Normal  HEENT: Normocephalic, Atraumatic Trachea Midline, No torticollis  Pulmonary: No audible wheezing or respiratory distress   Abdomen/GI: Non tender, non distended   Cardiovascular: No pitting edema, 2+ radial pulse   Skin: No Erythema, No Lacerations, No Fluctuation, No Ulcerations  Neurovascular: Sensation Intact to the Median, Ulnar, Radial Nerve, Motor Intact to the Median, Ulnar, Radial Nerve and Pulses Intact  Musculoskeletal: Normal, except as noted in detailed exam and in HPI.       MUSCULOSKELETAL EXAMINATION:  Right wrist  8 by 8 mm mass volar aspect of wrist  Mass is tender  Compartments soft  Brisk capillary refill     ___________________________________________________  STUDIES REVIEWED:  No studies to review   Prior x-rays of right wrist were reviewed      PROCEDURES PERFORMED:  Procedures  No Procedures performed today    _____________________________________________________      Scribe Attestation    I,:  Navya Capone MA am acting as a scribe while in the presence of the attending physician.:       I,:  Rosebud Severin, MD personally performed the services described in this documentation    as scribed in my presence.:

## 2023-07-23 PROBLEM — Z13.220 SCREENING FOR HYPERLIPIDEMIA: Status: RESOLVED | Noted: 2023-05-24 | Resolved: 2023-07-23

## 2023-07-23 PROBLEM — Z13.1 SCREENING FOR DIABETES MELLITUS: Status: RESOLVED | Noted: 2023-05-24 | Resolved: 2023-07-23

## 2023-08-29 ENCOUNTER — OFFICE VISIT (OUTPATIENT)
Dept: INTERNAL MEDICINE CLINIC | Facility: CLINIC | Age: 28
End: 2023-08-29
Payer: COMMERCIAL

## 2023-08-29 ENCOUNTER — APPOINTMENT (OUTPATIENT)
Dept: RADIOLOGY | Facility: CLINIC | Age: 28
End: 2023-08-29
Payer: COMMERCIAL

## 2023-08-29 VITALS
OXYGEN SATURATION: 98 % | DIASTOLIC BLOOD PRESSURE: 80 MMHG | BODY MASS INDEX: 35.11 KG/M2 | WEIGHT: 185.8 LBS | HEART RATE: 78 BPM | TEMPERATURE: 98 F | SYSTOLIC BLOOD PRESSURE: 118 MMHG

## 2023-08-29 DIAGNOSIS — M79.671 RIGHT FOOT PAIN: Primary | ICD-10-CM

## 2023-08-29 DIAGNOSIS — M79.671 RIGHT FOOT PAIN: ICD-10-CM

## 2023-08-29 PROCEDURE — 99213 OFFICE O/P EST LOW 20 MIN: CPT | Performed by: NURSE PRACTITIONER

## 2023-08-29 PROCEDURE — 73630 X-RAY EXAM OF FOOT: CPT

## 2023-08-29 NOTE — ASSESSMENT & PLAN NOTE
· 10/28/2021 was attacked by a dog while at work  · Bilateral foot xrays in chart  · Since then,  At the scar where her injury occurred, her Foot is throbbing, pins and needles and numb sensation  · No new injuries  · She had to elevate it yesterday after work  · It does not usually swollen  · Will refer her to neurology   · Will xray right foot

## 2023-08-29 NOTE — PATIENT INSTRUCTIONS
Problem List Items Addressed This Visit          Other    Right foot pain - Primary     10/28/2021 was attacked by a dog while at work  Bilateral foot xrays in chart  Since then,  At the scar where her injury occurred, her Foot is throbbing, pins and needles and numb sensation  No new injuries  She had to elevate it yesterday after work  It does not usually swollen  Will refer her to neurology   Will xray right foot          Relevant Orders    Ambulatory Referral to Neurology    XR foot 3+ vw right

## 2023-08-29 NOTE — PROGRESS NOTES
Assessment/Plan:     Problem List Items Addressed This Visit        Other    Right foot pain - Primary     10/28/2021 was attacked by a dog while at work  Bilateral foot xrays in chart  Since then,  At the scar where her injury occurred, her Foot is throbbing, pins and needles and numb sensation  No new injuries  She had to elevate it yesterday after work  It does not usually swollen  Will refer her to neurology   Will xray right foot          Relevant Orders    Ambulatory Referral to Neurology    XR foot 3+ vw right       Subjective:      Patient ID: Breezy Antoine is a 33 y.o. female. The patient is here today to discuss right foot pain. Please continue to the [facility] section of the note for details of today's visit. The following portions of the patient's history were reviewed and updated as appropriate:     Past Medical History:  She has a past medical history of Bipolar 1 disorder ([address]) and PTSD (post-traumatic stress disorder). ,  _______________________________________________________________________  Medical Problems:  does not have any pertinent problems on file.,  _______________________________________________________________________  Past Surgical History:   has a past surgical history that includes Knee surgery; Wrist surgery; and [place] tooth extraction. ,  _______________________________________________________________________  Family History:  family history is not on file. She was adopted. ,  _______________________________________________________________________  Social History:   reports that she has quit smoking. She has never used smokeless tobacco. She reports that she does not currently use alcohol. She reports current drug use. Drug: Marijuana. ,  _______________________________________________________________________  Allergies:  is allergic to lamotrigine, divalproex sodium (migraine) [valproic acid], and bupropion. .  _______________________________________________________________________  Current Outpatient Medications   Medication Sig Dispense Refill   • ARIPiprazole (ABILIFY) 5 mg tablet Take 5 mg by mouth daily at bedtime     • busPIRone (BUSPAR) 5 mg tablet Take 5 mg by mouth 3 (three) times a day     • fluticasone (FLONASE) 50 mcg/act nasal spray 1 spray into each nostril daily 16 g 0   • folic acid (FOLVITE) 1 mg tablet Take 1,000 mcg by mouth daily     • Melatonin 10 MG TABS Take by mouth     • VITAMIN D PO Take by mouth       No current facility-administered medications for this visit.     _______________________________________________________________________      Review of Systems   Musculoskeletal: Positive for arthralgias, gait problem and myalgias. Objective:  Vitals:    08/29/23 1250   BP: 118/80   BP Location: Left arm   Patient Position: Sitting   Cuff Size: Standard   Pulse: 78   Temp: 98 °F (36.7 °C)   SpO2: 98%   Weight: 84.3 kg (185 lb 12.8 oz)     Body mass index is 35.11 kg/m².      Physical Exam  Musculoskeletal:        Feet:

## 2023-09-08 ENCOUNTER — HOSPITAL ENCOUNTER (OUTPATIENT)
Dept: RADIOLOGY | Facility: HOSPITAL | Age: 28
Discharge: HOME | End: 2023-09-08
Attending: STUDENT IN AN ORGANIZED HEALTH CARE EDUCATION/TRAINING PROGRAM

## 2023-09-14 ENCOUNTER — OFFICE VISIT (OUTPATIENT)
Dept: NEUROLOGY | Facility: CLINIC | Age: 28
End: 2023-09-14
Payer: COMMERCIAL

## 2023-09-14 VITALS
SYSTOLIC BLOOD PRESSURE: 102 MMHG | WEIGHT: 185.4 LBS | OXYGEN SATURATION: 99 % | DIASTOLIC BLOOD PRESSURE: 72 MMHG | BODY MASS INDEX: 35.03 KG/M2 | HEART RATE: 83 BPM | TEMPERATURE: 98.2 F

## 2023-09-14 DIAGNOSIS — M79.671 RIGHT FOOT PAIN: Primary | ICD-10-CM

## 2023-09-14 PROCEDURE — 99204 OFFICE O/P NEW MOD 45 MIN: CPT | Performed by: PSYCHIATRY & NEUROLOGY

## 2023-09-14 NOTE — PROGRESS NOTES
1. Right foot pain  Assessment & Plan:  Ms Prince Pozo has pain and allodynia in the right foot. She has no deficits on examination. She was documented to have a heel spur on x-ray but it was present 2 years ago as well. Given that she has allodynia at the scar she may have local nerve entrapment, whether due to direct trauma or due to the scar. I doubt that the heel spur is contributing but we will make arrangements for electrodiagnostic testing to make sure that there are no features that might suggest tarsal tunnel syndrome. Unfortunately, allodynia is generally not as responsive to medications for neuropathic pain. Further measures will be considered based on her test results and clinical course. Podiatry consultation might be considered. Orders:  -     EMG 2 limb lower extremity; Future              I had the pleasure of seeing Rinku Chakraborty, a 33 y.o. female, for neuromuscular consultation. The referral was for foot numbness. 2 years ago she was working at a [employer] when a pit bull "used my left leg and right foot as a chew toy". She was wearing crocs and the dog pulled the right shoe off of her. She was seen at the hospital where her foot was "cleaned out". She had x-rays which showed small heel spurs. Within the first 6 weeks her right foot was "infected" but not requiring antibiotics. 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 She has experienced throbbing of her right foot that has made it difficult for her to work more than 4 hours. The scar at the dorsum of the right foot is tender. With light touch she feels "instant pins-and-needles" at the scar but she has not noticed loss of sensation. Her left heel feels "a little bit tight". Sometimes she "walks on the side of my foot" on the right but she denies weakness or imbalance. Her throbbing and tingling have been particularly bad in the last month or 2.   X-rays performed last month showed a right inferior calcaneal spur. Past Medical History:   Diagnosis Date   • Bipolar 1 disorder ([address])    • PTSD (post-traumatic stress disorder)        Past Surgical History:   Procedure Laterality Date   • KNEE SURGERY     • WISDOM TOOTH EXTRACTION     • WRIST SURGERY         Lamotrigine, Divalproex sodium (migraine) [valproic acid], and Bupropion    Social History     Tobacco Use   Smoking Status Former   Smokeless Tobacco Never       Social History     Substance and Sexual Activity   Alcohol Use Not Currently       Social History     Substance and Sexual Activity   Drug Use Yes   • Types: Marijuana       Family History   Adopted: Yes         Current Outpatient Medications:   •  ARIPiprazole (ABILIFY) 5 mg tablet, Take 5 mg by mouth daily at bedtime, Disp: , Rfl:   •  busPIRone (BUSPAR) 5 mg tablet, Take 5 mg by mouth 3 (three) times a day, Disp: , Rfl:   •  fluticasone (FLONASE) 50 mcg/act nasal spray, 1 spray into each nostril daily, Disp: 16 g, Rfl: 0  •  folic acid (FOLVITE) 1 mg tablet, Take 1,000 mcg by mouth daily, Disp: , Rfl:   •  Melatonin 10 MG TABS, Take by mouth, Disp: , Rfl:   •  VITAMIN D PO, Take by mouth, Disp: , Rfl:       Review of Systems   Constitutional: Negative for appetite change, fatigue and fever. HENT: Negative. Negative for hearing loss, tinnitus, trouble swallowing and voice change. Eyes: Negative. Negative for photophobia, pain and visual disturbance. Respiratory: Negative. Negative for shortness of breath. Cardiovascular: Negative. Negative for palpitations. Gastrointestinal: Negative. Negative for nausea and vomiting. Endocrine: Negative. Negative for cold intolerance. Genitourinary: Negative. Negative for dysuria, frequency and urgency. Musculoskeletal: Positive for gait problem. Negative for back pain, myalgias and neck pain. Skin: Negative. Negative for rash. Allergic/Immunologic: Negative.     Neurological: Positive for numbness (R foot from dog bit6e 2021, cannot tolerate any weight on toop of foot). Negative for dizziness, tremors, seizures, syncope, facial asymmetry, speech difficulty, weakness, light-headedness and headaches. Hematological: Negative. Does not bruise/bleed easily. Psychiatric/Behavioral: Negative. Negative for confusion, hallucinations and sleep disturbance. On examination,     Blood pressure 102/72, pulse 83, temperature 98.2 °F (36.8 °C), temperature source Temporal, weight 84.1 kg (185 lb 6.4 oz), SpO2 99 %, unknown if currently breastfeeding. Well developed, well nourished, in no acute distress    Normocephalic, atraumatic    Heart: regular rate and rhythm    Extremities: no clubbing, cyanosis, or edema    Speech and cognition appeared normal    Cranial nerves:  II: Pupils equal, round, and reactive to light. No gross visual field defect. I did not appreciate optic disc edema. III, IV, VI: Extraocular movements intact  V: Normal facial sensation in all three divisions of the trigeminal nerve bilaterally  VII: Normal facial strength  VIII: Hearing intact to finger rub bilaterally  IX, X: Palate elevated symmetrically  XI: Sternocleidomastoid strength normal bilaterally  XII: Tongue protruded in midline without atrophy or fibrillations    Motor:  Normal tone and bulk throughout. Muscle strength testing by the MRC scale was 5/5 in the deltoid, biceps, triceps, wrist extensors, wrist flexors, finger extensors, finger flexors,.  Hip flexors, quadriceps, ankle dorsiflexors, ankle plantar flexors, and EHL bilaterally    Deep tendon reflexes:   2+ and symmetrical in the biceps, triceps, brachioradialis, patellas, and ankles  Toes downgoing (no Babinski sign)  No Tapia's sign    Sensation: Normal vibration, pinprick, and light touch throughout    Cerebellar: normal finger to nose and heel to shin testing    Gait: Normal heel, toe, and tandem gait            There are no Patient Instructions on file for this visit. Thank you very much for allowing me to participate in your patient's care. Please feel free to contact me for any questions or concerns. Please be aware of the inherent limitations of voice recognition software, which may result in transcriptional errors.     Horacio Walter MD

## 2023-09-14 NOTE — ASSESSMENT & PLAN NOTE
Ms Jose Alexandra has pain and allodynia in the right foot. She has no deficits on examination. She was documented to have a heel spur on x-ray but it was present 2 years ago as well. Given that she has allodynia at the scar she may have local nerve entrapment, whether due to direct trauma or due to the scar. I doubt that the heel spur is contributing but we will make arrangements for electrodiagnostic testing to make sure that there are no features that might suggest tarsal tunnel syndrome. Unfortunately, allodynia is generally not as responsive to medications for neuropathic pain. Further measures will be considered based on her test results and clinical course. Podiatry consultation might be considered.

## 2023-10-12 ENCOUNTER — HOSPITAL ENCOUNTER (OUTPATIENT)
Dept: RADIOLOGY | Facility: HOSPITAL | Age: 28
Discharge: HOME | End: 2023-10-12
Attending: STUDENT IN AN ORGANIZED HEALTH CARE EDUCATION/TRAINING PROGRAM
Payer: COMMERCIAL

## 2023-10-12 DIAGNOSIS — M67.431 GANGLION CYST OF VOLAR ASPECT OF RIGHT WRIST: ICD-10-CM

## 2023-10-12 PROCEDURE — 20606 DRAIN/INJ JOINT/BURSA W/US: CPT

## 2023-11-03 ENCOUNTER — HOSPITAL ENCOUNTER (OUTPATIENT)
Dept: NEUROLOGY | Facility: CLINIC | Age: 28
End: 2023-11-03
Payer: COMMERCIAL

## 2023-11-03 DIAGNOSIS — M79.671 RIGHT FOOT PAIN: ICD-10-CM

## 2023-11-03 PROBLEM — R20.0 NUMBNESS: Status: ACTIVE | Noted: 2023-11-03

## 2023-11-03 PROCEDURE — 95912 NRV CNDJ TEST 11-12 STUDIES: CPT | Performed by: PSYCHIATRY & NEUROLOGY

## 2023-11-03 PROCEDURE — 95886 MUSC TEST DONE W/N TEST COMP: CPT | Performed by: PSYCHIATRY & NEUROLOGY

## 2023-11-17 PROBLEM — Z23 ENCOUNTER FOR IMMUNIZATION: Status: ACTIVE | Noted: 2023-11-17

## 2023-11-17 PROBLEM — Z12.4 SCREENING FOR CERVICAL CANCER: Status: ACTIVE | Noted: 2023-11-17

## 2023-11-17 PROBLEM — E66.812 CLASS 2 OBESITY DUE TO EXCESS CALORIES WITHOUT SERIOUS COMORBIDITY WITH BODY MASS INDEX (BMI) OF 35.0 TO 35.9 IN ADULT: Status: ACTIVE | Noted: 2021-12-27

## 2023-11-20 ENCOUNTER — OFFICE VISIT (OUTPATIENT)
Dept: INTERNAL MEDICINE CLINIC | Facility: CLINIC | Age: 28
End: 2023-11-20
Payer: COMMERCIAL

## 2023-11-20 VITALS
DIASTOLIC BLOOD PRESSURE: 70 MMHG | SYSTOLIC BLOOD PRESSURE: 122 MMHG | HEIGHT: 61 IN | WEIGHT: 182.2 LBS | OXYGEN SATURATION: 97 % | TEMPERATURE: 98.9 F | HEART RATE: 94 BPM | BODY MASS INDEX: 34.4 KG/M2

## 2023-11-20 DIAGNOSIS — F41.9 ANXIETY AND DEPRESSION: ICD-10-CM

## 2023-11-20 DIAGNOSIS — F43.10 PTSD (POST-TRAUMATIC STRESS DISORDER): ICD-10-CM

## 2023-11-20 DIAGNOSIS — J06.9 ACUTE URI: Primary | ICD-10-CM

## 2023-11-20 DIAGNOSIS — F32.A ANXIETY AND DEPRESSION: ICD-10-CM

## 2023-11-20 DIAGNOSIS — Z13.29 SCREENING FOR THYROID DISORDER: ICD-10-CM

## 2023-11-20 DIAGNOSIS — Z13.1 SCREENING FOR DIABETES MELLITUS: ICD-10-CM

## 2023-11-20 DIAGNOSIS — M79.671 RIGHT FOOT PAIN: ICD-10-CM

## 2023-11-20 DIAGNOSIS — F90.0 ADHD (ATTENTION DEFICIT HYPERACTIVITY DISORDER), INATTENTIVE TYPE: ICD-10-CM

## 2023-11-20 DIAGNOSIS — Z13.220 SCREENING FOR HYPERLIPIDEMIA: ICD-10-CM

## 2023-11-20 DIAGNOSIS — E55.9 VITAMIN D DEFICIENCY: ICD-10-CM

## 2023-11-20 DIAGNOSIS — Z12.4 SCREENING FOR CERVICAL CANCER: ICD-10-CM

## 2023-11-20 DIAGNOSIS — E66.09 CLASS 1 OBESITY DUE TO EXCESS CALORIES WITHOUT SERIOUS COMORBIDITY WITH BODY MASS INDEX (BMI) OF 34.0 TO 34.9 IN ADULT: ICD-10-CM

## 2023-11-20 PROCEDURE — 99214 OFFICE O/P EST MOD 30 MIN: CPT | Performed by: NURSE PRACTITIONER

## 2023-11-20 RX ORDER — FOLIC ACID 1 MG/1
1000 TABLET ORAL DAILY
Qty: 90 TABLET | Refills: 1 | Status: SHIPPED | OUTPATIENT
Start: 2023-11-20

## 2023-11-20 NOTE — ASSESSMENT & PLAN NOTE
Patient has Based upon the Diagnostic and Statistical Manual of Mental Health Disorders (DSM-5 guide for mental health disorders)     Patient has positive findings as listed on the Adult ADHD-RS-IV with Adult Prompts Document. This patient was educated on side effects, risks of taking this type of medication which include abuse, misuse, dependence, addiction and tolerance. All questions were answered including different dosing levels, increasing and decreasing of of this medication. We discussed their continued open discussions with the PCP in order to make sure that they are on the correct dose. We reviewed the potential side effects of the medications including, but are not limited to, constipation, diarrhea, nausea/upset stomach, drowsiness, fatigue, dizziness, urinary retention, visual changes, insomnia, headaches, nightmares, slowed breathing while sleeping, UTI issues, impaired judgment, addiction issues and the risk of fatal overdose if not taken as prescribed. We discussed the importance of notifying the office/provider immediately of any side effects. We discussed the importance of immediate notification if there are any feelings of suicidality thoughts or behaviors   We discussed the importance of contacting the office if he has any tachycardia or fainting situations. The patient was warned against driving while taking sedation medications. We discussed that sharing medications is a felony. We discussed other side effects such as QT prolongation, risks of Myocardial Infarction (heart attack), stroke and sudden death. We discussed immediate notification if there is any seizure-like activity. We discussed issues with angioedema as an anaphylactic reactions. Patient agrees to provide a Urine Sample for Toxicology purposes. I have personally [facility] prior to prescribing this medication.   The patient understands and agrees to use these medications only as prescribed. At this point in time, the patient is showing no signs of addiction, abuse, diversion or suicidal ideation. All the patient's questions were answered to their satisfaction. [place] Prescription Drug Monitoring Program report was reviewed and was appropriate      _____________________________________________________     How to take this test:     Choose from: no/none; mild; moderate; severe. [place] the choice that applies. Once you have taken the test, resubmit the answers to us and we will enter them into your chart. 1. He/She has had ________ findings listed under the carelessness topic:  Is making increased mistakes, rushing through work, not checking work, messy workspace. [place]: no/none; mild; moderate; severe. 2. He/She has had _________ findings listed under the difficulty sustaining attention in activities topics:    Where he/she has trouble paying attention, able to stay focused on his/her work, takes longer to complete his/her tasks as normal, and has trouble remembering what he/she has read. [place]: no/none; mild; moderate; severe. 3. He/She has had _________ findings listed under the does not listen topics:   Others have complained that he/she does not seem to listen or respond when spoken to, he/she needs people to repeat directions, and has noticed that he/she misses key parts of conversations. [place]: no/none; mild; moderate; severe. 4. He/She has ________ findings listed under the no follow-through topic:    He/She has trouble finishing things such as work and chores, he/she does leave things half done or finished, he/she has trouble following instructions which are complex and multi steps, and he/she needs to frequently write things down or he/she will forget them. [place]: no/none; mild; moderate; severe.    5. He/She has _________ findings listed under the can not organized topic:    He/She has trouble organizing tasks and prioritizing work and chores, he/she does require assistance of others to help plan for him/her, he/she does have trouble with time management, and he/she does procrastinate. [place]: no/none; mild; moderate; severe. 6. He/She has _________ findings listed under the avoid/dislikes task requiring sustained mental effort topic:    He/She avoids tasks that are difficult or lengthy, he/she has to force himself/herself to finish these tasks, he/she finds it extremely hard, and he/she continues to procrastinate. [place]: no/none; mild; moderate; severe. 7. He/She has __________ findings listed under the lose important items topic:  He/She does frequently lose things, and is always looking for important items. He/She does seem to get in trouble for this. He/She also seems to put items in a safe place that he/she is unable to find later. [place]: no/none; mild; moderate; severe. 8. He/She has _______ findings listed under the easily distractible topic:  He/She is easily distracted by other conversations, television, radio. He/She does have to remain in isolation of some sort in order to get any work done. Finds it difficult to get back on track once he/she is off track, and will find other things to do rather than his/her chores or work. [place]: no/none; mild; moderate; severe. 9. He/She has _______ findings listed under the forgetful in daily activities topic:  He/She forgets his/her daily routine items, and forgets to bring things to and from work on a daily basis. He/She does have to make numerous notes. [place]: no/none; mild; moderate; severe. 10. He/She has _________ findings listed under the score was and fidgets topic:  He/She is unable to sit still, and always is fidgeting in his/her chair. He/She does tap and move his/her feet or pen or pencil.   He/She does always seem to be fussing either with his/her hair or clothing and he/she states that he/she cannot stop moving it seems automatic. [place]: no/none; mild; moderate; severe. 11. He/She has _________ findings listed under the can not stay seated topic:  He/She does have trouble sitting in 1 section/seat for any length of time. He/She states that he/she would rather be up and standing or able to walk rather than sitting. He/She does have to force himself/herself to stay seated. He/She does usually stay away from any requirements where he/she will need to be seated for long periods of time. [place]: no/none; mild; moderate; severe. 12. He/She has _________ findings listed under the runs/climbs excessively topic:  He/She was feels physically restless, and does seem more agitated when he/she has made to sit still. [place]: no/none; mild; moderate; severe. 13. He/She has severe findings listed under the can not play/work quietly topic:  He/She is unable to sit and read a book, needs to be doing some type of physical activity. [place]: no/none; mild; moderate; severe. 14. He/She has _______ findings listed under the on the go, driven by a motor topic:  He/She feels hard to slow down, and that he/she always has a lot of energy and is always on the go. He/She is unable to relax. [place]: no/none; mild; moderate; severe. 13. He/She has _______ findings listed under the talks excessively topic:  He/She talks a lot, all the time, definitely more than other people. He/She also finds herself to be much louder than other people. [place]: no/none; mild; moderate; severe. 12. He/She has _________ findings listed under the blurts out answers topic:  He/She states that he/she does blurted out answers, is always having a hard time finding the rationale to wait his/her turn. [place]: no/none; mild; moderate; severe. 16. He/She has __________ findings listed under the can not wait for turn topic:  Again he/she finds it hard to weight his/her turn, he/she feels frustrated when there are delays or when he/she has to wait in lines.   [place]: no/none; mild; moderate; severe. 25. He/She has ___________ findings listed under the intrudes/interrupts others topic:  He/She does butt into other people's conversations and seems to interrupt others. [place]: no/none; mild; moderate; severe.      11/20/2023  Will have her take the test and send me the answers via [employer]  She does continue to have forgetfulness and brain fog

## 2023-11-20 NOTE — PROGRESS NOTES
Name: Anu Geiger      : 1995      MRN: [id number]  Encounter Provider: FRANKIE Elmore  Encounter Date: 2023   Encounter department: [id number]     1. Acute URI  Assessment & Plan:  2023  Sore throat with white spots on throat, left ear clogged   Bilateral ears are without redness, edema or tenderness at this time  There is no cerumen  Her throat is red and she states 'this is the best it has been'      2. Anxiety and depression  Assessment & Plan:  2023  Follows Jamison Garcia from Psychiatry  Had medication adjustments:  Clonazepam 0.5mg daily  Prazosin 1mg at bedtime  Increased Abilify to 10mg at bedtime   Continue buspar 5mg three times a day    She should continue folic acid. Orders:  -     folic acid (FOLVITE) 1 mg tablet; Take 1 tablet (1,000 mcg total) by mouth daily    3. Class 1 obesity due to excess calories without serious comorbidity with body mass index (BMI) of 34.0 to 34.9 in adult  Assessment & Plan:  Lifestyle modification, diet and exercise discussed       4. Right foot pain  Assessment & Plan:  2023 Neurology  Assessment & Plan:  Ms Kenia Montano has pain and allodynia in the right foot. She has no deficits on examination. She was documented to have a heel spur on x-ray but it was present 2 years ago as well. Given that she has allodynia at the scar she may have local nerve entrapment, whether due to direct trauma or due to the scar. I doubt that the heel spur is contributing but we will make arrangements for electrodiagnostic testing to make sure that there are no features that might suggest tarsal tunnel syndrome. Unfortunately, allodynia is generally not as responsive to medications for neuropathic pain. Further measures will be considered based on her test results and clinical course. Podiatry consultation might be considered. Orders:  -     EMG 2 limb lower extremity;  Future     11/3/2023 EMG  Normal Exam of Bilateral Lower Extremities - result in Encounters      5. Screening for cervical cancer  Assessment & Plan:  Referral to GYN  Increased menstrual cycle since her IUD placement     Orders:  -     Ambulatory Referral to Gynecology; Future    6. Screening for thyroid disorder  -     TSH, 3rd generation; Future  -     T4, free; Future    7. Screening for hyperlipidemia    8. Screening for diabetes mellitus    9. Vitamin D deficiency  Assessment & Plan:  Ordered 5/24/2023  Not collected as of this date. 11/20/2023  Will check lab  May need to take supplement again      10. PTSD (post-traumatic stress disorder)  Assessment & Plan:  11/20/2023  Since her dog bite      11. ADHD (attention deficit hyperactivity disorder), inattentive type  Assessment & Plan:  Patient has Based upon the Diagnostic and Statistical Manual of Mental Health Disorders (DSM-5 guide for mental health disorders)     Patient has positive findings as listed on the Adult ADHD-RS-IV with Adult Prompts Document. This patient was educated on side effects, risks of taking this type of medication which include abuse, misuse, dependence, addiction and tolerance. All questions were answered including different dosing levels, increasing and decreasing of of this medication. We discussed their continued open discussions with the PCP in order to make sure that they are on the correct dose. We reviewed the potential side effects of the medications including, but are not limited to, constipation, diarrhea, nausea/upset stomach, drowsiness, fatigue, dizziness, urinary retention, visual changes, insomnia, headaches, nightmares, slowed breathing while sleeping, UTI issues, impaired judgment, addiction issues and the risk of fatal overdose if not taken as prescribed. We discussed the importance of notifying the office/provider immediately of any side effects.    We discussed the importance of immediate notification if there are any feelings of suicidality thoughts or behaviors   We discussed the importance of contacting the office if he has any tachycardia or fainting situations. The patient was warned against driving while taking sedation medications. We discussed that sharing medications is a felony. We discussed other side effects such as QT prolongation, risks of Myocardial Infarction (heart attack), stroke and sudden death. We discussed immediate notification if there is any seizure-like activity. We discussed issues with angioedema as an anaphylactic reactions. Patient agrees to provide a Urine Sample for Toxicology purposes. I have personally [facility] the [facility] prior to prescribing this medication. The patient understands and agrees to use these medications only as prescribed. At this point in time, the patient is showing no signs of addiction, abuse, diversion or suicidal ideation. All the patient's questions were answered to their satisfaction. [place] Prescription Drug Monitoring Program report was reviewed and was appropriate      _____________________________________________________     How to take this test:     Choose from: no/none; mild; moderate; severe. [place] the choice that applies. Once you have taken the test, resubmit the answers to us and we will enter them into your chart. 1. He/She has had ________ findings listed under the carelessness topic:  Is making increased mistakes, rushing through work, not checking work, messy workspace. [place]: no/none; mild; moderate; severe. 2. He/She has had _________ findings listed under the difficulty sustaining attention in activities topics:    Where he/she has trouble paying attention, able to stay focused on his/her work, takes longer to complete his/her tasks as normal, and has trouble remembering what he/she has read. [place]: no/none; mild; moderate; severe.    3. He/She has had _________ findings listed under the does not listen topics:   Others have complained that he/she does not seem to listen or respond when spoken to, he/she needs people to repeat directions, and has noticed that he/she misses key parts of conversations. [place]: no/none; mild; moderate; severe. 4. He/She has ________ findings listed under the no follow-through topic:    He/She has trouble finishing things such as work and chores, he/she does leave things half done or finished, he/she has trouble following instructions which are complex and multi steps, and he/she needs to frequently write things down or he/she will forget them. [place]: no/none; mild; moderate; severe. 5. He/She has _________ findings listed under the can not organized topic:    He/She has trouble organizing tasks and prioritizing work and chores, he/she does require assistance of others to help plan for him/her, he/she does have trouble with time management, and he/she does procrastinate. [place]: no/none; mild; moderate; severe. 6. He/She has _________ findings listed under the avoid/dislikes task requiring sustained mental effort topic:    He/She avoids tasks that are difficult or lengthy, he/she has to force himself/herself to finish these tasks, he/she finds it extremely hard, and he/she continues to procrastinate. [place]: no/none; mild; moderate; severe. 7. He/She has __________ findings listed under the lose important items topic:  He/She does frequently lose things, and is always looking for important items. He/She does seem to get in trouble for this. He/She also seems to put items in a safe place that he/she is unable to find later. [place]: no/none; mild; moderate; severe. 8. He/She has _______ findings listed under the easily distractible topic:  He/She is easily distracted by other conversations, television, radio. He/She does have to remain in isolation of some sort in order to get any work done.   Finds it difficult to get back on track once he/she is off track, and will find other things to do rather than his/her chores or work. [place]: no/none; mild; moderate; severe. 9. He/She has _______ findings listed under the forgetful in daily activities topic:  He/She forgets his/her daily routine items, and forgets to bring things to and from work on a daily basis. He/She does have to make numerous notes. [place]: no/none; mild; moderate; severe. 10. He/She has _________ findings listed under the score was and fidgets topic:  He/She is unable to sit still, and always is fidgeting in his/her chair. He/She does tap and move his/her feet or pen or pencil. He/She does always seem to be fussing either with his/her hair or clothing and he/she states that he/she cannot stop moving it seems automatic. [place]: no/none; mild; moderate; severe. 11. He/She has _________ findings listed under the can not stay seated topic:  He/She does have trouble sitting in 1 section/seat for any length of time. He/She states that he/she would rather be up and standing or able to walk rather than sitting. He/She does have to force himself/herself to stay seated. He/She does usually stay away from any requirements where he/she will need to be seated for long periods of time. [place]: no/none; mild; moderate; severe. 12. He/She has _________ findings listed under the runs/climbs excessively topic:  He/She was feels physically restless, and does seem more agitated when he/she has made to sit still. [place]: no/none; mild; moderate; severe. 13. He/She has severe findings listed under the can not play/work quietly topic:  He/She is unable to sit and read a book, needs to be doing some type of physical activity. [place]: no/none; mild; moderate; severe. 14. He/She has _______ findings listed under the on the go, driven by a motor topic:  He/She feels hard to slow down, and that he/she always has a lot of energy and is always on the go. He/She is unable to relax.   [place]: no/none; mild; moderate; severe. 13. He/She has _______ findings listed under the talks excessively topic:  He/She talks a lot, all the time, definitely more than other people. He/She also finds herself to be much louder than other people. [place]: no/none; mild; moderate; severe. 12. He/She has _________ findings listed under the blurts out answers topic:  He/She states that he/she does blurted out answers, is always having a hard time finding the rationale to wait his/her turn. [place]: no/none; mild; moderate; severe. 16. He/She has __________ findings listed under the can not wait for turn topic:  Again he/she finds it hard to weight his/her turn, he/she feels frustrated when there are delays or when he/she has to wait in lines. [place]: no/none; mild; moderate; severe. 25. He/She has ___________ findings listed under the intrudes/interrupts others topic:  He/She does butt into other people's conversations and seems to interrupt others. [place]: no/none; mild; moderate; severe. 11/20/2023  Will have her take the test and send me the answers via [employer]  She does continue to have forgetfulness and brain fog        BMI Counseling: Body mass index is 34.43 kg/m². The BMI is above normal. Nutrition recommendations include decreasing portion sizes, encouraging healthy choices of fruits and vegetables, decreasing fast food intake, consuming healthier snacks, limiting drinks that contain sugar, moderation in carbohydrate intake, increasing intake of lean protein, reducing intake of saturated and trans fat and reducing intake of cholesterol. Exercise recommendations include exercising 3-5 times per week. No pharmacotherapy was ordered. Rationale for BMI follow-up plan is due to patient being overweight or obese. Depression Screening and Follow-up Plan: Patient assessed for underlying major depression. Brief counseling provided and recommend additional follow-up/re-evaluation next office visit. Subjective      The patient is here today to discuss her URI symptoms and her continued forgetfulness and brain fog. Please continue to the [facility] section of the note for details of today's visit. Review of Systems   Constitutional:  Negative for activity change, chills, fatigue and fever. HENT:  Negative for rhinorrhea and sore throat. Eyes:  Negative for pain. Respiratory:  Negative for cough and shortness of breath. Cardiovascular:  Negative for chest pain, palpitations and leg swelling. Gastrointestinal:  Negative for abdominal pain, constipation, diarrhea, nausea and vomiting. Genitourinary:  Negative for difficulty urinating, flank pain, frequency and urgency. Musculoskeletal:  Positive for arthralgias and myalgias. Negative for gait problem and joint swelling. Chronic foot pain   Skin:  Negative for color change. Neurological:  Negative for dizziness, weakness, light-headedness and headaches. Psychiatric/Behavioral:  Positive for decreased concentration (with brain fog) and sleep disturbance. The patient is nervous/anxious. All other systems reviewed and are negative.       Current Outpatient Medications on File Prior to Visit   Medication Sig   • ARIPiprazole (ABILIFY) 10 mg tablet Take 10 mg by mouth daily at bedtime   • busPIRone (BUSPAR) 5 mg tablet Take 5 mg by mouth 3 (three) times a day   • clonazePAM (KlonoPIN) 0.5 mg tablet Take 0.5 mg by mouth daily   • fluticasone (FLONASE) 50 mcg/act nasal spray 1 spray into each nostril daily (Patient taking differently: 1 spray into each nostril as needed)   • Melatonin 10 MG TABS Take by mouth as needed   • prazosin (MINIPRESS) 1 mg capsule Take 1 mg by mouth daily at bedtime   • [DISCONTINUED] folic acid (FOLVITE) 1 mg tablet Take 1,000 mcg by mouth daily   • [DISCONTINUED] VITAMIN D PO Take by mouth (Patient not taking: Reported on 11/20/2023)       Objective     /70 (BP Location: Right arm, Patient Position: Sitting)   Pulse 94   Temp 98.9 °F (37.2 °C) (Tympanic)   Ht 5' 1" (1.549 m)   Wt 82.6 kg (182 lb 3.2 oz)   LMP  (LMP Unknown)   SpO2 97%   BMI 34.43 kg/m²     Physical Exam  Vitals reviewed. Constitutional:       General: She is awake. Appearance: Normal appearance. She is well-developed and overweight. HENT:      Head: Normocephalic and atraumatic. Nose: Nose normal.      Mouth/Throat:      Mouth: Mucous membranes are moist.      Pharynx: Posterior oropharyngeal erythema present. Eyes:      Extraocular Movements: Extraocular movements intact. Cardiovascular:      Rate and Rhythm: Normal rate and regular rhythm. Pulses: Normal pulses. Heart sounds: Normal heart sounds. Pulmonary:      Effort: Pulmonary effort is normal.      Breath sounds: Normal breath sounds. Abdominal:      General: Bowel sounds are normal.      Palpations: Abdomen is soft. Musculoskeletal:         General: Normal range of motion. Cervical back: Normal range of motion. Right lower leg: No edema. Left lower leg: No edema. Feet:      Comments: Chronic scar pain  Skin:     General: Skin is warm and dry. Neurological:      Mental Status: She is alert and oriented to person, place, and time. Psychiatric:         Attention and Perception: Attention normal.         Mood and Affect: Mood is anxious and depressed. Speech: Speech normal.         Behavior: Behavior normal. Behavior is cooperative.       Comments: Decreased concentration and brain fog       FRANKIE Arzate

## 2023-11-20 NOTE — PATIENT INSTRUCTIONS
Problem List Items Addressed This Visit          Respiratory    Acute URI - Primary     11/20/2023  Sore throat with white spots on throat, left ear clogged   Bilateral ears are without redness, edema or tenderness at this time  There is no cerumen  Her throat is red and she states 'this is the best it has been'            Other    ADHD (attention deficit hyperactivity disorder), inattentive type     Patient has Based upon the Diagnostic and Statistical Manual of Mental Health Disorders (DSM-5 guide for mental health disorders)     Patient has positive findings as listed on the Adult ADHD-RS-IV with Adult Prompts Document. This patient was educated on side effects, risks of taking this type of medication which include abuse, misuse, dependence, addiction and tolerance. All questions were answered including different dosing levels, increasing and decreasing of of this medication. We discussed their continued open discussions with the PCP in order to make sure that they are on the correct dose. We reviewed the potential side effects of the medications including, but are not limited to, constipation, diarrhea, nausea/upset stomach, drowsiness, fatigue, dizziness, urinary retention, visual changes, insomnia, headaches, nightmares, slowed breathing while sleeping, UTI issues, impaired judgment, addiction issues and the risk of fatal overdose if not taken as prescribed. We discussed the importance of notifying the office/provider immediately of any side effects. We discussed the importance of immediate notification if there are any feelings of suicidality thoughts or behaviors   We discussed the importance of contacting the office if he has any tachycardia or fainting situations. The patient was warned against driving while taking sedation medications. We discussed that sharing medications is a felony.    We discussed other side effects such as QT prolongation, risks of Myocardial Infarction (heart attack), stroke and sudden death. We discussed immediate notification if there is any seizure-like activity. We discussed issues with angioedema as an anaphylactic reactions. Patient agrees to provide a Urine Sample for Toxicology purposes. I have personally reviewed the [employer] (PDMP) website prior to prescribing this medication. The patient understands and agrees to use these medications only as prescribed. At this point in time, the patient is showing no signs of addiction, abuse, diversion or suicidal ideation. All the patient's questions were answered to their satisfaction. [place] Prescription Drug Monitoring Program report was reviewed and was appropriate      _____________________________________________________     How to take this test:     Choose from: no/none; mild; moderate; severe. [place] the choice that applies. Once you have taken the test, resubmit the answers to us and we will enter them into your chart. 1. He/She has had ________ findings listed under the carelessness topic:  Is making increased mistakes, rushing through work, not checking work, messy workspace. [place]: no/none; mild; moderate; severe. 2. He/She has had _________ findings listed under the difficulty sustaining attention in activities topics:    Where he/she has trouble paying attention, able to stay focused on his/her work, takes longer to complete his/her tasks as normal, and has trouble remembering what he/she has read. [place]: no/none; mild; moderate; severe. 3. He/She has had _________ findings listed under the does not listen topics:   Others have complained that he/she does not seem to listen or respond when spoken to, he/she needs people to repeat directions, and has noticed that he/she misses key parts of conversations. [place]: no/none; mild; moderate; severe.    4. He/She has ________ findings listed under the no follow-through topic:    He/She has trouble finishing things such as work and chores, he/she does leave things half done or finished, he/she has trouble following instructions which are complex and multi steps, and he/she needs to frequently write things down or he/she will forget them. [place]: no/none; mild; moderate; severe. 5. He/She has _________ findings listed under the can not organized topic:    He/She has trouble organizing tasks and prioritizing work and chores, he/she does require assistance of others to help plan for him/her, he/she does have trouble with time management, and he/she does procrastinate. [place]: no/none; mild; moderate; severe. 6. He/She has _________ findings listed under the avoid/dislikes task requiring sustained mental effort topic:    He/She avoids tasks that are difficult or lengthy, he/she has to force himself/herself to finish these tasks, he/she finds it extremely hard, and he/she continues to procrastinate. [place]: no/none; mild; moderate; severe. 7. He/She has __________ findings listed under the lose important items topic:  He/She does frequently lose things, and is always looking for important items. He/She does seem to get in trouble for this. He/She also seems to put items in a safe place that he/she is unable to find later. [place]: no/none; mild; moderate; severe. 8. He/She has _______ findings listed under the easily distractible topic:  He/She is easily distracted by other conversations, television, radio. He/She does have to remain in isolation of some sort in order to get any work done. Finds it difficult to get back on track once he/she is off track, and will find other things to do rather than his/her chores or work. [place]: no/none; mild; moderate; severe. 9. He/She has _______ findings listed under the forgetful in daily activities topic:  He/She forgets his/her daily routine items, and forgets to bring things to and from work on a daily basis.   He/She does have to make numerous notes.  [place]: no/none; mild; moderate; severe. 10. He/She has _________ findings listed under the score was and fidgets topic:  He/She is unable to sit still, and always is fidgeting in his/her chair. He/She does tap and move his/her feet or pen or pencil. He/She does always seem to be fussing either with his/her hair or clothing and he/she states that he/she cannot stop moving it seems automatic. [place]: no/none; mild; moderate; severe. 11. He/She has _________ findings listed under the can not stay seated topic:  He/She does have trouble sitting in 1 section/seat for any length of time. He/She states that he/she would rather be up and standing or able to walk rather than sitting. He/She does have to force himself/herself to stay seated. He/She does usually stay away from any requirements where he/she will need to be seated for long periods of time. [place]: no/none; mild; moderate; severe. 12. He/She has _________ findings listed under the runs/climbs excessively topic:  He/She was feels physically restless, and does seem more agitated when he/she has made to sit still. [place]: no/none; mild; moderate; severe. 13. He/She has severe findings listed under the can not play/work quietly topic:  He/She is unable to sit and read a book, needs to be doing some type of physical activity. [place]: no/none; mild; moderate; severe. 14. He/She has _______ findings listed under the on the go, driven by a motor topic:  He/She feels hard to slow down, and that he/she always has a lot of energy and is always on the go. He/She is unable to relax. [place]: no/none; mild; moderate; severe. 13. He/She has _______ findings listed under the talks excessively topic:  He/She talks a lot, all the time, definitely more than other people. He/She also finds herself to be much louder than other people. [place]: no/none; mild; moderate; severe.    16. He/She has _________ findings listed under the blurts out answers topic: He/She states that he/she does blurted out answers, is always having a hard time finding the rationale to wait his/her turn. [place]: no/none; mild; moderate; severe. 16. He/She has __________ findings listed under the can not wait for turn topic:  Again he/she finds it hard to weight his/her turn, he/she feels frustrated when there are delays or when he/she has to wait in lines. [place]: no/none; mild; moderate; severe. 25. He/She has ___________ findings listed under the intrudes/interrupts others topic:  He/She does butt into other people's conversations and seems to interrupt others. [place]: no/none; mild; moderate; severe. 11/20/2023  Will have her take the test and send me the answers via [employer]  She does continue to have forgetfulness and brain fog         Anxiety and depression     1/20/2023  Follows Jamison Garcia from Psychiatry  Had medication adjustments:  Clonazepam 0.5mg daily  Prazosin 1mg at bedtime  Increased Abilify to 10mg at bedtime   Continue buspar 5mg three times a day    She should continue folic acid. Relevant Medications    folic acid (FOLVITE) 1 mg tablet    Class 1 obesity due to excess calories without serious comorbidity with body mass index (BMI) of 34.0 to 34.9 in adult     Lifestyle modification, diet and exercise discussed          Vitamin D deficiency     Ordered 5/24/2023  Not collected as of this date. 11/20/2023  Will check lab  May need to take supplement again         Screening for diabetes mellitus    Screening for hyperlipidemia    PTSD (post-traumatic stress disorder)     11/20/2023  Since her dog bite         Right foot pain     9/14/2023 Neurology  Assessment & Plan:  Ms Randon Mohs has pain and allodynia in the right foot. She has no deficits on examination. She was documented to have a heel spur on x-ray but it was present 2 years ago as well.   Given that she has allodynia at the scar she may have local nerve entrapment, whether due to direct trauma or due to the scar. I doubt that the heel spur is contributing but we will make arrangements for electrodiagnostic testing to make sure that there are no features that might suggest tarsal tunnel syndrome. Unfortunately, allodynia is generally not as responsive to medications for neuropathic pain. Further measures will be considered based on her test results and clinical course. Podiatry consultation might be considered. Orders:  -     EMG 2 limb lower extremity;  Future     11/3/2023 EMG  Normal Exam of Bilateral Lower Extremities - result in Encounters         Screening for cervical cancer     Referral to GYN  Increased menstrual cycle since her IUD placement          Relevant Orders    Ambulatory Referral to Gynecology    Screening for thyroid disorder    Relevant Orders    TSH, 3rd generation    T4, free

## 2023-11-20 NOTE — ASSESSMENT & PLAN NOTE
1/20/2023  Yovany Garcia from Psychiatry  Had medication adjustments:  Clonazepam 0.5mg daily  Prazosin 1mg at bedtime  Increased Abilify to 10mg at bedtime   Continue buspar 5mg three times a day    She should continue folic acid.

## 2023-11-20 NOTE — ASSESSMENT & PLAN NOTE
11/20/2023  Sore throat with white spots on throat, left ear clogged   Bilateral ears are without redness, edema or tenderness at this time  There is no cerumen  Her throat is red and she states 'this is the best it has been'

## 2023-11-20 NOTE — ASSESSMENT & PLAN NOTE
9/14/2023 Neurology  Assessment & Plan:  Ms Michelle Merino has pain and allodynia in the right foot. She has no deficits on examination. She was documented to have a heel spur on x-ray but it was present 2 years ago as well. Given that she has allodynia at the scar she may have local nerve entrapment, whether due to direct trauma or due to the scar. I doubt that the heel spur is contributing but we will make arrangements for electrodiagnostic testing to make sure that there are no features that might suggest tarsal tunnel syndrome. Unfortunately, allodynia is generally not as responsive to medications for neuropathic pain. Further measures will be considered based on her test results and clinical course. Podiatry consultation might be considered. Orders:  -     EMG 2 limb lower extremity;  Future     11/3/2023 EMG  Normal Exam of Bilateral Lower Extremities - result in Encounters

## 2023-11-20 NOTE — ASSESSMENT & PLAN NOTE
Ordered 5/24/2023  Not collected as of this date.     11/20/2023  Will check lab  May need to take supplement again

## 2024-01-16 PROBLEM — Z12.4 SCREENING FOR CERVICAL CANCER: Status: RESOLVED | Noted: 2023-11-17 | Resolved: 2024-01-16

## 2024-01-16 PROBLEM — Z13.220 SCREENING FOR HYPERLIPIDEMIA: Status: RESOLVED | Noted: 2023-05-24 | Resolved: 2024-01-16

## 2024-01-16 PROBLEM — Z13.1 SCREENING FOR DIABETES MELLITUS: Status: RESOLVED | Noted: 2023-05-24 | Resolved: 2024-01-16

## 2024-01-19 PROBLEM — Z13.29 SCREENING FOR THYROID DISORDER: Status: RESOLVED | Noted: 2023-11-20 | Resolved: 2024-01-19

## 2024-01-19 PROBLEM — J06.9 ACUTE URI: Status: RESOLVED | Noted: 2023-11-20 | Resolved: 2024-01-19

## 2024-04-07 ENCOUNTER — APPOINTMENT (OUTPATIENT)
Dept: RADIOLOGY | Facility: CLINIC | Age: 29
End: 2024-04-07
Payer: COMMERCIAL

## 2024-04-07 ENCOUNTER — OFFICE VISIT (OUTPATIENT)
Dept: URGENT CARE | Facility: CLINIC | Age: 29
End: 2024-04-07
Payer: COMMERCIAL

## 2024-04-07 VITALS
DIASTOLIC BLOOD PRESSURE: 75 MMHG | SYSTOLIC BLOOD PRESSURE: 136 MMHG | BODY MASS INDEX: 32.5 KG/M2 | HEART RATE: 95 BPM | WEIGHT: 172 LBS | OXYGEN SATURATION: 100 %

## 2024-04-07 DIAGNOSIS — S93.402A SPRAIN OF LEFT ANKLE, UNSPECIFIED LIGAMENT, INITIAL ENCOUNTER: Primary | ICD-10-CM

## 2024-04-07 DIAGNOSIS — S93.402A SPRAIN OF LEFT ANKLE, UNSPECIFIED LIGAMENT, INITIAL ENCOUNTER: ICD-10-CM

## 2024-04-07 PROCEDURE — 73610 X-RAY EXAM OF ANKLE: CPT

## 2024-04-07 PROCEDURE — 99203 OFFICE O/P NEW LOW 30 MIN: CPT

## 2024-04-07 NOTE — PROGRESS NOTES
"  [facility]    NAME: Vivien Coreas is a 29 y.o. female  : 1995    MRN: [id number]  DATE: 2024  TIME: 11:13 AM    Assessment and Plan   Sprain of left ankle, unspecified ligament, initial encounter [S93.402A]  1. Sprain of left ankle, unspecified ligament, initial encounter  XR ankle 3+ vw left        Xr of the ankle obtained: no acute intraosseous abnormality.  Wrapped ankle in ACE wrap.  Refrain from sports/exercise involving the foot for 1 week.  Recommended RICE and ibuprofen for symptoms.   Follow up with PCP regarding ankle pain.     Patient Instructions     Continue to RICE: rest area, ice it, keep ACE wrap compression on, and elevate above the level of the heart.  Take ibuprofen for pain and swelling control.  Refrain from exercise/sports for at least one week.  Continue to move the ankle to increase mobility.   If tests have been performed at [facility], our office will contact you with results if changes need to be made to the care plan discussed with you at the visit.  You can review your full results on [facility]  Follow up with PCP or orthopedics for continued ankle pain.   Proceed to ER if symptoms worsen.    Chief Complaint     Chief Complaint   Patient presents with    Ankle Pain     Pt is here for left ankle pain. Was out last night, had too much to drink. She lost her balance walking, not sure exactly how she injured it. Took an anti inflammatory this morning. Believe it was Asprin.          History of Present Illness       Presents with left ankle pain that began last night. She \"stubbled on air\" last night landing on the ankle. She was unable to walk on it right away. Worse pain and swelling this morning, limited range of motion, Most of pain over the left lateral ankle.         Review of Systems   Review of Systems   Constitutional:  Negative for chills and fever.   HENT:  Negative for congestion and sore throat.    Respiratory:  Negative for cough and shortness " of breath.    Cardiovascular:  Negative for chest pain.   Gastrointestinal:  Negative for abdominal pain.   Musculoskeletal:  Positive for gait problem and myalgias.   Skin:  Negative for wound.   Neurological:  Negative for weakness and numbness.   Psychiatric/Behavioral:  Negative for confusion.          Current Medications       Current Outpatient Medications:     ARIPiprazole (ABILIFY) 10 mg tablet, Take 10 mg by mouth daily at bedtime, Disp: , Rfl:     busPIRone (BUSPAR) 5 mg tablet, Take 5 mg by mouth 3 (three) times a day, Disp: , Rfl:     clonazePAM (KlonoPIN) 0.5 mg tablet, Take 0.5 mg by mouth daily, Disp: , Rfl:     fluticasone (FLONASE) 50 mcg/act nasal spray, 1 spray into each nostril daily (Patient taking differently: 1 spray into each nostril as needed), Disp: 16 g, Rfl: 0    folic acid (FOLVITE) 1 mg tablet, Take 1 tablet (1,000 mcg total) by mouth daily, Disp: 90 tablet, Rfl: 1    Melatonin 10 MG TABS, Take by mouth as needed, Disp: , Rfl:     prazosin (MINIPRESS) 1 mg capsule, Take 1 mg by mouth daily at bedtime, Disp: , Rfl:     Current Allergies     Allergies as of 04/07/2024 - Reviewed 04/07/2024   Allergen Reaction Noted    Lamotrigine Hives and Rash 10/19/2011    Divalproex sodium (migraine) [valproic acid] Hives 05/24/2023    Bupropion Other (See Comments) 02/13/2015            The following portions of the patient's history were reviewed and updated as appropriate: allergies, current medications, past family history, past medical history, past social history, past surgical history and problem list.     Past Medical History:   Diagnosis Date    Bipolar 1 disorder (HCC)     PTSD (post-traumatic stress disorder)        Past Surgical History:   Procedure Laterality Date    KNEE SURGERY      WISDOM TOOTH EXTRACTION      WRIST SURGERY         Family History   Adopted: Yes         Medications have been verified.        Objective   /75   Pulse 95   Wt 78 kg (172 lb)   SpO2 100%   BMI 32.50  kg/m²        Physical Exam     Physical Exam  Vitals reviewed.   Constitutional:       Appearance: Normal appearance.   Cardiovascular:      Rate and Rhythm: Normal rate and regular rhythm.      Pulses: Normal pulses.      Heart sounds: Normal heart sounds. No murmur heard.  Pulmonary:      Effort: Pulmonary effort is normal. No respiratory distress.      Breath sounds: Normal breath sounds.   Musculoskeletal:      Left lower leg: Normal.      Left ankle: Swelling present. Tenderness present over the lateral malleolus. No medial malleolus tenderness. Decreased range of motion.      Left Achilles Tendon: Normal.      Left foot: Normal.   Skin:     General: Skin is warm and dry.      Capillary Refill: Capillary refill takes less than 2 seconds.   Neurological:      General: No focal deficit present.      Mental Status: She is alert and oriented to person, place, and time.   Psychiatric:         Mood and Affect: Mood normal.         Behavior: Behavior normal.

## 2024-04-07 NOTE — PATIENT INSTRUCTIONS
Continue to RICE: rest area, ice it, keep ACE wrap compression on, and elevate above the level of the heart.  Take ibuprofen for pain and swelling control.  Refrain from exercise/sports for at least one week.  Continue to move the ankle to increase mobility.   If tests have been performed at [facility], our office will contact you with results if changes need to be made to the care plan discussed with you at the visit.  You can review your full results on [facility]  Follow up with PCP or orthopedics for continued ankle pain.   Proceed to ER if symptoms worsen.

## 2024-05-28 PROBLEM — M77.32 HEEL SPUR, LEFT: Status: ACTIVE | Noted: 2024-05-28

## 2024-05-28 PROBLEM — E66.811 CLASS 1 OBESITY DUE TO EXCESS CALORIES WITHOUT SERIOUS COMORBIDITY WITH BODY MASS INDEX (BMI) OF 32.0 TO 32.9 IN ADULT: Chronic | Status: ACTIVE | Noted: 2021-12-27

## 2024-05-28 PROBLEM — G47.09 OTHER INSOMNIA: Status: ACTIVE | Noted: 2024-05-28

## 2024-05-28 PROBLEM — Z12.4 SCREENING FOR CERVICAL CANCER: Status: ACTIVE | Noted: 2024-05-28

## 2024-05-28 PROBLEM — D50.8 IRON DEFICIENCY ANEMIA SECONDARY TO INADEQUATE DIETARY IRON INTAKE: Chronic | Status: ACTIVE | Noted: 2024-05-28

## 2024-05-28 PROBLEM — M77.32 HEEL SPUR, LEFT: Chronic | Status: ACTIVE | Noted: 2024-05-28

## 2024-05-28 PROBLEM — F90.0 ADHD (ATTENTION DEFICIT HYPERACTIVITY DISORDER), INATTENTIVE TYPE: Chronic | Status: ACTIVE | Noted: 2019-01-11

## 2024-05-28 PROBLEM — D50.8 IRON DEFICIENCY ANEMIA SECONDARY TO INADEQUATE DIETARY IRON INTAKE: Status: ACTIVE | Noted: 2024-05-28

## 2024-05-28 PROBLEM — E53.8 FOLIC ACID DEFICIENCY: Chronic | Status: ACTIVE | Noted: 2024-05-28

## 2024-05-28 PROBLEM — G47.09 OTHER INSOMNIA: Chronic | Status: ACTIVE | Noted: 2024-05-28

## 2024-05-28 PROBLEM — E53.8 FOLIC ACID DEFICIENCY: Status: ACTIVE | Noted: 2024-05-28

## 2024-05-28 PROBLEM — Z51.81 ENCOUNTER FOR THERAPEUTIC DRUG LEVEL MONITORING: Chronic | Status: ACTIVE | Noted: 2024-05-28

## 2024-05-28 PROBLEM — F41.9 ANXIETY AND DEPRESSION: Chronic | Status: ACTIVE | Noted: 2020-08-03

## 2024-05-28 PROBLEM — F32.A ANXIETY AND DEPRESSION: Chronic | Status: ACTIVE | Noted: 2020-08-03

## 2024-05-28 PROBLEM — E55.9 VITAMIN D DEFICIENCY: Chronic | Status: ACTIVE | Noted: 2023-05-24

## 2024-05-28 PROBLEM — E66.09 CLASS 1 OBESITY DUE TO EXCESS CALORIES WITHOUT SERIOUS COMORBIDITY WITH BODY MASS INDEX (BMI) OF 32.0 TO 32.9 IN ADULT: Chronic | Status: ACTIVE | Noted: 2021-12-27

## 2024-05-28 PROBLEM — Z51.81 ENCOUNTER FOR THERAPEUTIC DRUG LEVEL MONITORING: Status: ACTIVE | Noted: 2024-05-28

## 2024-06-27 PROBLEM — Z13.220 SCREENING FOR HYPERLIPIDEMIA: Status: RESOLVED | Noted: 2023-05-24 | Resolved: 2024-06-27

## 2024-06-27 PROBLEM — Z13.29 SCREENING FOR THYROID DISORDER: Status: RESOLVED | Noted: 2023-11-20 | Resolved: 2024-06-27

## 2024-06-27 PROBLEM — Z12.4 SCREENING FOR CERVICAL CANCER: Status: RESOLVED | Noted: 2024-05-28 | Resolved: 2024-06-27

## 2024-06-27 PROBLEM — Z13.1 SCREENING FOR DIABETES MELLITUS: Status: RESOLVED | Noted: 2023-05-24 | Resolved: 2024-06-27

## 2025-04-23 ENCOUNTER — OFFICE VISIT (OUTPATIENT)
Dept: URGENT CARE | Facility: CLINIC | Age: 30
End: 2025-04-23
Payer: COMMERCIAL

## 2025-04-23 VITALS
RESPIRATION RATE: 18 BRPM | DIASTOLIC BLOOD PRESSURE: 92 MMHG | HEART RATE: 88 BPM | OXYGEN SATURATION: 97 % | TEMPERATURE: 98.6 F | SYSTOLIC BLOOD PRESSURE: 140 MMHG

## 2025-04-23 DIAGNOSIS — B00.1 HERPES LABIALIS: Primary | ICD-10-CM

## 2025-04-23 DIAGNOSIS — R22.0 SWELLING OF UPPER LIP: ICD-10-CM

## 2025-04-23 PROCEDURE — 99213 OFFICE O/P EST LOW 20 MIN: CPT

## 2025-04-23 RX ORDER — PREDNISONE 20 MG/1
40 TABLET ORAL DAILY
Qty: 6 TABLET | Refills: 0 | Status: SHIPPED | OUTPATIENT
Start: 2025-04-23 | End: 2025-04-26

## 2025-04-23 RX ORDER — VALACYCLOVIR HYDROCHLORIDE 1 G/1
2000 TABLET, FILM COATED ORAL 2 TIMES DAILY
Qty: 10 TABLET | Refills: 0 | Status: SHIPPED | OUTPATIENT
Start: 2025-04-23 | End: 2025-04-24

## 2025-04-23 NOTE — PROGRESS NOTES
[facility]        NAME: Vivien Coreas is a 30 y.o. female  : 1995    MRN: [id number]  DATE: 2025  TIME: 11:43 AM    Assessment and Plan   Herpes labialis [B00.1]  1. Herpes labialis  valACYclovir (VALTREX) 1,000 mg tablet      2. Swelling of upper lip  predniSONE 20 mg tablet            Patient Instructions     Valtrex 2000 mg BID x 1 day.  Prednisone as directed.     Follow up with PCP in 3-5 days.  Proceed to the ER with worsening symptoms.     Chief Complaint     Chief Complaint   Patient presents with    Mouth Lesions     Patient with lesion to upper left lip for 3 days. No sick symptoms. Tender to the touch.          History of Present Illness       The patient presents today with complaints of a painful cold sore and lip swelling to her L upper lip x 3 days. Reports history of cold sores, but swelling is worse then previous episodes. Has tried OTC medications with minimal relief. Denies any other symptoms. Has taken valtrex in the past.         Review of Systems   Review of Systems   Constitutional:  Negative for chills and fever.   HENT:  Positive for facial swelling and mouth sores. Negative for congestion, postnasal drip, rhinorrhea and sore throat.    Respiratory:  Negative for cough.          Current Medications       Current Outpatient Medications:     predniSONE 20 mg tablet, Take 2 tablets (40 mg total) by mouth daily for 3 days, Disp: 6 tablet, Rfl: 0    valACYclovir (VALTREX) 1,000 mg tablet, Take 2 tablets (2,000 mg total) by mouth 2 (two) times a day for 1 day, Disp: 10 tablet, Rfl: 0    ARIPiprazole (ABILIFY) 10 mg tablet, Take 10 mg by mouth daily at bedtime (Patient not taking: Reported on 2025), Disp: , Rfl:     busPIRone (BUSPAR) 5 mg tablet, Take 5 mg by mouth 3 (three) times a day (Patient not taking: Reported on 2025), Disp: , Rfl:     clonazePAM (KlonoPIN) 0.5 mg tablet, Take 0.5 mg by mouth daily (Patient not taking: Reported on 2025), Disp: ,  Rfl:     fluticasone (FLONASE) 50 mcg/act nasal spray, 1 spray into each nostril daily (Patient not taking: Reported on 4/23/2025), Disp: 16 g, Rfl: 0    folic acid (FOLVITE) 1 mg tablet, Take 1 tablet (1,000 mcg total) by mouth daily (Patient not taking: Reported on 4/23/2025), Disp: 90 tablet, Rfl: 1    Melatonin 10 MG TABS, Take by mouth as needed (Patient not taking: Reported on 4/23/2025), Disp: , Rfl:     prazosin (MINIPRESS) 1 mg capsule, Take 1 mg by mouth daily at bedtime (Patient not taking: Reported on 4/23/2025), Disp: , Rfl:     Current Allergies     Allergies as of 04/23/2025 - Reviewed 04/23/2025   Allergen Reaction Noted    Lamotrigine Hives and Rash 10/19/2011    Divalproex sodium (migraine) [valproic acid] Hives 05/24/2023    Bupropion Other (See Comments) 02/13/2015            The following portions of the patient's history were reviewed and updated as appropriate: allergies, current medications, past family history, past medical history, past social history, past surgical history and problem list.     Past Medical History:   Diagnosis Date    Bipolar 1 disorder (HCC)     PTSD (post-traumatic stress disorder)        Past Surgical History:   Procedure Laterality Date    KNEE SURGERY      WISDOM TOOTH EXTRACTION      WRIST SURGERY         Family History   Adopted: Yes         Medications have been verified.        Objective   /92   Pulse 88   Temp 98.6 °F (37 °C)   Resp 18   SpO2 97%        Physical Exam     Physical Exam  Vitals and nursing note reviewed.   Constitutional:       General: She is not in acute distress.     Appearance: Normal appearance.   HENT:      Head: Normocephalic and atraumatic.      Right Ear: External ear normal.      Left Ear: External ear normal.      Mouth/Throat:      Lips: Lesions present.      Mouth: Mucous membranes are moist.     Eyes:      Pupils: Pupils are equal, round, and reactive to light.   Cardiovascular:      Rate and Rhythm: Normal rate.    Pulmonary:      Effort: Pulmonary effort is normal.   Skin:     General: Skin is warm and dry.   Neurological:      Mental Status: She is alert and oriented to person, place, and time. Mental status is at baseline.   Psychiatric:         Mood and Affect: Mood normal.         Behavior: Behavior normal.

## 2025-04-23 NOTE — PATIENT INSTRUCTIONS
Valtrex 2000 mg BID x 1 day.  Prednisone as directed.     Follow up with PCP in 3-5 days.  Proceed to the ER with worsening symptoms.

## 2025-06-04 ENCOUNTER — TELEPHONE (OUTPATIENT)
Dept: FAMILY MEDICINE CLINIC | Facility: CLINIC | Age: 30
End: 2025-06-04